# Patient Record
Sex: FEMALE | Race: WHITE | NOT HISPANIC OR LATINO | Employment: FULL TIME | URBAN - METROPOLITAN AREA
[De-identification: names, ages, dates, MRNs, and addresses within clinical notes are randomized per-mention and may not be internally consistent; named-entity substitution may affect disease eponyms.]

---

## 2017-01-05 ENCOUNTER — MYC MEDICAL ADVICE (OUTPATIENT)
Dept: PEDIATRICS | Facility: CLINIC | Age: 57
End: 2017-01-05

## 2017-01-11 ENCOUNTER — MYC REFILL (OUTPATIENT)
Dept: PEDIATRICS | Facility: CLINIC | Age: 57
End: 2017-01-11

## 2017-01-11 DIAGNOSIS — N95.1 MENOPAUSAL SYNDROME (HOT FLASHES): ICD-10-CM

## 2017-01-11 NOTE — TELEPHONE ENCOUNTER
venlafaxine (EFFEXOR-XR) 75 MG 24 hr capsule    Last Written Prescription Date: 11/7/16  Last Fill Quantity: 90, # refills: 0  Last Office Visit with [facility], [facility] or [facility] prescribing provider: 1/15/16   Next 5 appointments (look out 90 days)     Feb 20, 2017  7:45 AM   Lab visit with NL LAB [facility]   [facility]    [address] [facility] [address]   [phone]            Feb 20, 2017  8:00 AM   Screening Mammogram with ERMA1   [facility])    [address] [facility] [address]   [phone]            Feb 20, 2017  8:30 AM   MyChart Physical Adult with Bel Lowry MD   [facility] ([facility])    [address] [facility] [address]  [facility] [address]   [phone]                   BP Readings from Last 3 Encounters:   01/15/16 130/84   01/20/15 112/76   02/17/14 123/76     Pulse: (for Fetzima)  CREATININE   Date Value Ref Range Status   11/14/2013 0.66 0.52 - 1.04 mg/dL Final   ]    Last PHQ-9 score on record=   PHQ-9 SCORE 1/20/2015   Total Score 1   Total Score Lexis -

## 2017-01-11 NOTE — TELEPHONE ENCOUNTER
Message from MyChart:  Original authorizing provider: Braulio Cruz MD, MD Char Newman would like a refill of the following medications:  venlafaxine (EFFEXOR-XR) 75 MG 24 hr capsule [Braulio Cruz MD, MD]    Preferred pharmacy: [facility] [address]    Comment:  Thank you. Char Newman

## 2017-01-17 NOTE — TELEPHONE ENCOUNTER
Jazmine Epstein CMA at 1/11/2017  3:28 PM      Status: Signed         Expand All Collapse All    venlafaxine (EFFEXOR-XR) 75 MG 24 hr capsule    Last Written Prescription Date: 11/7/16  Last Fill Quantity: 90, # refills: 0  Last Office Visit with [facility], [facility] or [facility] prescribing provider: 1/15/16    Next 5 appointments (look out 90 days)       Feb 20, 2017  7:45 AM    Lab visit with NL LAB E[facility]    [facility] ([facility])      [address] [facility] [address]    [phone]                 Feb 20, 2017  8:00 AM    Screening Mammogram with ERMA1    [facility] ([facility])      [address] [facility] [address]    [phone]                 Feb 20, 2017  8:30 AM    MyChart Physical Adult with Bel Lowry MD    [facility] ([facility])      [address] [facility] [address]  [facility] [address]    [phone]                          BP Readings from Last 3 Encounters:    01/15/16  130/84    01/20/15  112/76    02/17/14  123/76      Pulse: (for Fetzima)  CREATININE    Date  Value  Ref Range  Status    11/14/2013  0.66  0.52 - 1.04 mg/dL  Final    ]    Last PHQ-9 score on record=    PHQ-9 SCORE  1/20/2015    Total Score  1    Total Score MyChart  -                       Patient needing appointment for refill per protocol.  Patient cancelled last two appointments.  Will route to provider to review and approve.

## 2017-01-18 RX ORDER — VENLAFAXINE HYDROCHLORIDE 75 MG/1
75 CAPSULE, EXTENDED RELEASE ORAL DAILY
Qty: 30 CAPSULE | Refills: 0 | Status: SHIPPED | OUTPATIENT
Start: 2017-01-18 | End: 2017-02-14

## 2017-02-14 DIAGNOSIS — N95.1 MENOPAUSAL SYNDROME (HOT FLASHES): ICD-10-CM

## 2017-02-14 NOTE — LETTER
February 24, 2017      Char Newman  [address] [facility] [address]      Dear Char,    We recently received a call from your pharmacy requesting a refill of your medication.    A review of your chart indicates that an appointment is required with your provider.  Please call the clinic to schedule your annual physical appointment.    We have authorized one refill of your medication to allow time for you to schedule.   If you have a history of diabetes or high cholesterol, please come in fasting for the appointment. Fasting entails nothing to eat or drink 8 hours prior to your appointment; with the exception on water. You may take your medication the day of the appointment.    Thank you,      Braulio Cruz MD

## 2017-02-15 NOTE — TELEPHONE ENCOUNTER
Venlafaxine     Last Written Prescription Date: 01/18/2017  Last Fill Quantity: 30, # refills: 0  Last Office Visit with [facility] primary care provider:  01/15/2016   Next 5 appointments (look out 90 days)     Feb 20, 2017  7:45 AM CST   Lab visit with [facility]   [facility]    [address] [facility] [address]   [phone]            Feb 20, 2017  8:00 AM CST   Screening Mammogram with ERMA1   [facility]    [address] [facility] [address]   [phone]            Feb 20, 2017  8:30 AM CST   MyChart Physical Adult with Bel Lowry MD   [facility]    [address] [address]   [phone]                   Last PHQ-9 score on record=   PHQ-9 SCORE 1/20/2015   Total Score 1   Total Score MyChart -     PHQ-9 SCORE 12/9/2013 12/9/2013 1/20/2015   Total Score 2 - 1   Total Score MyChart - 2 -     OLI-7 SCORE 4/12/2013 5/29/2013 1/20/2015   Total Score 13 9 2       César VILCHIS (R)

## 2017-02-16 RX ORDER — VENLAFAXINE HYDROCHLORIDE 75 MG/1
75 CAPSULE, EXTENDED RELEASE ORAL DAILY
Qty: 14 CAPSULE | Refills: 0 | Status: SHIPPED | OUTPATIENT
Start: 2017-02-16 | End: 2017-02-16

## 2017-02-16 NOTE — TELEPHONE ENCOUNTER
Routing refill request to provider for review/approval because:  Annabel given x1 and patient did not follow up, please advise  Patient needs to be seen because it has been more than 1 year since last office visit.    Patient cancelled last 2 appointments.    Nelli Oneill RN,   [facility]

## 2017-02-16 NOTE — TELEPHONE ENCOUNTER
Gave 2 weeks. Pt has appointment with Dr. Lowry on 2/20/17 for physical. Advise patient to have Dr. Lowry future refill.

## 2017-02-24 NOTE — TELEPHONE ENCOUNTER
Attempt #3:  Left message for patient stating prescription has been sent to the pharmacy. Advised patient to call clinic to schedule annual physical appointment prior to next refill Clinic number given.    Unable to contact patient via phone. No future appointment has been scheduled at this time.  Refill letter mailed to patient.  Melissa Lea CMA

## 2017-04-05 ENCOUNTER — OFFICE VISIT (OUTPATIENT)
Dept: PEDIATRICS | Facility: CLINIC | Age: 57
End: 2017-04-05
Payer: COMMERCIAL

## 2017-04-05 VITALS
TEMPERATURE: 98.1 F | DIASTOLIC BLOOD PRESSURE: 84 MMHG | BODY MASS INDEX: 28.53 KG/M2 | OXYGEN SATURATION: 100 % | SYSTOLIC BLOOD PRESSURE: 126 MMHG | WEIGHT: 161 LBS | HEIGHT: 63 IN | HEART RATE: 79 BPM

## 2017-04-05 DIAGNOSIS — F41.1 GAD (GENERALIZED ANXIETY DISORDER): ICD-10-CM

## 2017-04-05 DIAGNOSIS — Z11.59 NEED FOR HEPATITIS C SCREENING TEST: ICD-10-CM

## 2017-04-05 DIAGNOSIS — F32.5 MAJOR DEPRESSION IN COMPLETE REMISSION (H): Primary | ICD-10-CM

## 2017-04-05 DIAGNOSIS — N95.1 MENOPAUSAL SYNDROME (HOT FLASHES): ICD-10-CM

## 2017-04-05 DIAGNOSIS — L85.3 DRY SKIN: ICD-10-CM

## 2017-04-05 DIAGNOSIS — E78.5 HYPERLIPIDEMIA LDL GOAL <160: ICD-10-CM

## 2017-04-05 PROCEDURE — 99213 OFFICE O/P EST LOW 20 MIN: CPT | Performed by: INTERNAL MEDICINE

## 2017-04-05 RX ORDER — VENLAFAXINE HYDROCHLORIDE 150 MG/1
150 CAPSULE, EXTENDED RELEASE ORAL DAILY
Qty: 30 CAPSULE | Refills: 5 | Status: SHIPPED | OUTPATIENT
Start: 2017-04-05 | End: 2017-09-22

## 2017-04-05 ASSESSMENT — ANXIETY QUESTIONNAIRES
1. FEELING NERVOUS, ANXIOUS, OR ON EDGE: MORE THAN HALF THE DAYS
2. NOT BEING ABLE TO STOP OR CONTROL WORRYING: MORE THAN HALF THE DAYS
6. BECOMING EASILY ANNOYED OR IRRITABLE: SEVERAL DAYS
7. FEELING AFRAID AS IF SOMETHING AWFUL MIGHT HAPPEN: NOT AT ALL
GAD7 TOTAL SCORE: 13
5. BEING SO RESTLESS THAT IT IS HARD TO SIT STILL: NEARLY EVERY DAY
3. WORRYING TOO MUCH ABOUT DIFFERENT THINGS: MORE THAN HALF THE DAYS

## 2017-04-05 ASSESSMENT — PATIENT HEALTH QUESTIONNAIRE - PHQ9: 5. POOR APPETITE OR OVEREATING: NEARLY EVERY DAY

## 2017-04-05 NOTE — LETTER
My Depression Action Plan  Name: Char Newman   Date of Birth 1960  Date: 4/5/2017    My doctor: Braulio Cruz   My clinic: [address] [address] [phone]          GREEN    ZONE   Good Control    What it looks like:     Things are going generally well. You have normal up s and down s. You may even feel depressed from time to time, but bad moods usually last less than a day.   What you need to do:  1. Continue to care for yourself (see self care plan)  2. Check your depression survival kit and update it as needed  3. Follow your physician s recommendations including any medication.  4. Do not stop taking medication unless you consult with your physician first.           YELLOW         ZONE Getting Worse    What it looks like:     Depression is starting to interfere with your life.     It may be hard to get out of bed; you may be starting to isolate yourself from others.    Symptoms of depression are starting to last most all day and this has happened for several days.     You may have suicidal thoughts but they are not constant.   What you need to do:     1. Call your care team, your response to treatment will improve if you keep your care team informed of your progress. Yellow periods are signs an adjustment may need to be made.     2. Continue your self-care, even if you have to fake it!    3. Talk to someone in your support network    4. Open up your depression survival kit           RED    ZONE Medical Alert - Get Help    What it looks like:     Depression is seriously interfering with your life.     You may experience these or other symptoms: You can t get out of bed most days, can t work or engage in other necessary activities, you have trouble taking care of basic hygiene, or basic responsibilities, thoughts of suicide or death that will not go away, self-injurious behavior.     What you need to do:  1. Call your care team and request  a same-day appointment. If they are not available (weekends or after hours) call your local crisis line, emergency room or 911.      Electronically signed by: Braulio Cruz MD, April 5, 2017    Depression Self Care Plan / Survival Kit    Self-Care for Depression  Here s the deal. Your body and mind are really not as separate as most people think.  What you do and think affects how you feel and how you feel influences what you do and think. This means if you do things that people who feel good do, it will help you feel better.  Sometimes this is all it takes.  There is also a place for medication and therapy depending on how severe your depression is, so be sure to consult with your medical provider and/ or Behavioral Health Consultant if your symptoms are worsening or not improving.     In order to better manage my stress, I will:    Exercise  Get some form of exercise, every day. This will help reduce pain and release endorphins, the  feel good  chemicals in your brain. This is almost as good as taking antidepressants!  This is not the same as joining a gym and then never going! (they count on that by the way ) It can be as simple as just going for a walk or doing some gardening, anything that will get you moving.      Hygiene   Maintain good hygiene (Get out of bed in the morning, Make your bed, Brush your teeth, Take a shower, and Get dressed like you were going to work, even if you are unemployed).  If your clothes don't fit try to get ones that do.    Diet  I will strive to eat foods that are good for me, drink plenty of water, and avoid excessive sugar, caffeine, alcohol, and other mood-altering substances.  Some foods that are helpful in depression are: complex carbohydrates, B vitamins, flaxseed, fish or fish oil, fresh fruits and vegetables.    Psychotherapy  I agree to participate in Individual Therapy (if recommended).    Medication  If prescribed medications, I agree to take them.  Missing doses can result  in serious side effects.  I understand that drinking alcohol, or other illicit drug use, may cause potential side effects.  I will not stop my medication abruptly without first discussing it with my provider.    Staying Connected With Others  I will stay in touch with my friends, family members, and my primary care provider/team.    Use your imagination  Be creative.  We all have a creative side; it doesn t matter if it s oil painting, sand castles, or mud pies! This will also kick up the endorphins.    Witness Beauty  (AKA stop and smell the roses) Take a look outside, even in mid-winter. Notice colors, textures. Watch the squirrels and birds.     Service to others  Be of service to others.  There is always someone else in need.  By helping others we can  get out of ourselves  and remember the really important things.  This also provides opportunities for practicing all the other parts of the program.    Humor  Laugh and be silly!  Adjust your TV habits for less news and crime-drama and more comedy.    Control your stress  Try breathing deep, massage therapy, biofeedback, and meditation. Find time to relax each day.     My support system    Clinic Contact:  Phone number:    Contact 1:  Phone number:    Contact 2:  Phone number:    [facility]/:  Phone number:    Therapist:  Phone number:    Local crisis center:    Phone number:    Other community support:  Phone number:

## 2017-04-05 NOTE — PROGRESS NOTES
"  SUBJECTIVE:                                                    Char Newman is a 56 year old female who presents to clinic today for the following health issues:      Medication Followup of all meds    Taking Medication as prescribed: yes    Side Effects:  None    Medication Helping Symptoms:  yes     She is doing well. Her mother passed away and she is taking care of her father.   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 Now, more stress, and anxiety.   Took Effexor for hot flashes, not working well for that. She was to increase Effexor from 75 to 150 mg but she stayed at 75 mg.   Reports increase dry skin and some weight gain. No change in eating.     PHQ-9 SCORE 12/9/2013 1/20/2015 4/5/2017   Total Score - 1 -   Total Score MyChart 2 - -   Total Score - - 3     OLI-7 SCORE 5/29/2013 1/20/2015 4/5/2017   Total Score 9 2 -   Total Score - - 13             Problem list, Medication list, Allergies, and Medical/Social/Surgical histories reviewed in [facility] and updated as appropriate.    OBJECTIVE:                                                    /84 (Cuff Size: Adult Regular)  Pulse 79  Temp 98.1  F (36.7  C) (Temporal)  Ht 5' 3\" (1.6 m)  Wt 161 lb (73 kg)  LMP 10/10/2013  SpO2 100%  BMI 28.52 kg/m2    GEN: healthy, alert and no distress       Diagnostic test results:  No results found for this or any previous visit (from the past 24 hour(s)).       ASSESSMENT/PLAN:                                                      56 year old female with the following diagnoses and treatment plan:      ICD-10-CM    1. Major depression in complete remission (H) F32.5    2. Hyperlipidemia LDL goal <160 E78.5 Lipid Profile (Chol, Trig, HDL, LDL calc)   3. OLI (generalized anxiety disorder) F41.1    4. Need for hepatitis C screening test Z11.59 Hepatitis C antibody   5. Menopausal syndrome (hot flashes) N95.1 venlafaxine (EFFEXOR-XR) 150 MG 24 hr capsule   6. Dry skin L85.3 TSH with free T4 reflex     -- increase effexor to " 150 mg and Mychart update in 1 month.  -- due for a few labs, she will schedule.   -- if all normal, may return in one year for annual med review.     Braulio Cruz MD-PhD  [facility]    (Note: Chart documentation was done in part with Dragon Voice Recognition software. Although reviewed after completion, some word and grammatical errors may remain.)

## 2017-04-05 NOTE — PATIENT INSTRUCTIONS
Make appointment(s) for:   -- fasting lab appointment.  -- Mychart update in one month.        Medication(s) prescribed today:    Orders Placed This Encounter   Medications     venlafaxine (EFFEXOR-XR) 150 MG 24 hr capsule     Sig: Take 1 capsule (150 mg) by mouth daily     Dispense:  30 capsule     Refill:  5

## 2017-04-05 NOTE — MR AVS SNAPSHOT
After Visit Summary   4/5/2017    Char Newman    MRN: [id number]           Patient Information     Date Of Birth          1960        Visit Information        Provider Department      4/5/2017 2:20 PM Braulio Cruz MD [facility]        Today's Diagnoses     Major depression in complete remission (H)    -  1    Hyperlipidemia LDL goal <160        OLI (generalized anxiety disorder)        Need for hepatitis C screening test        Menopausal syndrome (hot flashes)        Dry skin          Care Instructions    Make appointment(s) for:   -- fasting lab appointment.  -- [employer] update in one month.        Medication(s) prescribed today:    Orders Placed This Encounter   Medications     venlafaxine (EFFEXOR-XR) 150 MG 24 hr capsule     Sig: Take 1 capsule (150 mg) by mouth daily     Dispense:  30 capsule     Refill:  5               Follow-ups after your visit        Future tests that were ordered for you today     Open Future Orders        Priority Expected Expires Ordered    TSH with free T4 reflex Routine  4/5/2018 4/5/2017    Hepatitis C antibody Routine  7/5/2017 4/5/2017    Lipid Profile (Chol, Trig, HDL, LDL calc) Routine  7/5/2017 4/5/2017            Who to contact     If you have questions or need follow up information about today's clinic visit or your schedule please contact [facility] directly at [phone].  Normal or non-critical lab and imaging results will be communicated to you by MyChart, letter or phone within 4 business days after the clinic has received the results. If you do not hear from us within 7 days, please contact the clinic through [employer] or phone. If you have a critical or abnormal lab result, we will notify you by phone as soon as possible.  Submit refill requests through [employer] or call your pharmacy and they will forward the refill request to us. Please allow 3 business days for your refill to be completed.          Additional  "Information About Your Visit        [employer] Information     [facility] [facility] gives you secure access to your electronic health record. If you see a primary care provider, you can also send messages to your care team and make appointments. If you have questions, please call your primary care clinic.  If you do not have a primary care provider, please call [phone] and they will assist you.      [facility] [facility] is an electronic gateway that provides easy, online access to your medical records. With [facility] [facility], you can request a clinic appointment, read your test results, renew a prescription or communicate with your care team.     To access your existing account, please contact your [facility] or call [phone] for assistance.        Care EveryWhere ID     This is your Care EveryWhere ID. This could be used by other organizations to access your [facility] [place] medical records  [id number]        Your Vitals Were     Pulse Temperature Height Last Period Pulse Oximetry BMI (Body Mass Index)    79 98.1  F (36.7  C) (Temporal) 5' 3\" (1.6 m) 10/10/2013 100% 28.52 kg/m2       Blood Pressure from Last 3 Encounters:   04/05/17 126/84   01/15/16 130/84   01/20/15 112/76    Weight from Last 3 Encounters:   04/05/17 161 lb (73 kg)   01/15/16 157 lb (71.2 kg)   01/20/15 154 lb (69.9 kg)              We Performed the Following     DEPRESSION ACTION PLAN (DAP)          Today's Medication Changes          These changes are accurate as of: 4/5/17  2:47 PM.  If you have any questions, ask your nurse or doctor.               These medicines have changed or have updated prescriptions.        Dose/Directions    venlafaxine 150 MG 24 hr capsule   Commonly known as:  EFFEXOR-XR   This may have changed:  medication strength   Used for:  Menopausal syndrome (hot flashes)   Changed by:  Braulio Cruz MD        Dose:  150 mg   Take 1 capsule (150 mg) by mouth daily   Quantity:  30 capsule   Refills:  5            Where to get your " medicines      These medications were sent to [facility] [id number] [facility] - [place], [place] - [address]  [address], [facility] [address]     Phone:  [phone]     venlafaxine 150 MG 24 hr capsule                Primary Care Provider Office Phone # Fax #    Braulio Cruz [phone]       [facility] [address]  [facility] [address]        Thank you!     Thank you for choosing [facility]  for your care. Our goal is always to provide you with excellent care. Hearing back from our patients is one way we can continue to improve our services. Please take a few minutes to complete the written survey that you may receive in the mail after your visit with us. Thank you!             Your Updated Medication List - Protect others around you: Learn how to safely use, store and throw away your medicines at www.disposemymeds.org.          This list is accurate as of: 4/5/17  2:47 PM.  Always use your most recent med list.                   Brand Name Dispense Instructions for use    acetaminophen 500 MG tablet    TYLENOL     Take 500-1,000 mg by mouth every 6 hours as needed For cramps       triamcinolone 0.1 % cream    KENALOG    80 g    Apply sparingly to affected area 2 times daily as needed       venlafaxine 150 MG 24 hr capsule    EFFEXOR-XR    30 capsule    Take 1 capsule (150 mg) by mouth daily

## 2017-04-05 NOTE — NURSING NOTE
"Chief Complaint   Patient presents with     Recheck Medication       Initial Temp 98.1  F (36.7  C) (Temporal)  Ht 5' 3\" (1.6 m)  Wt 161 lb (73 kg)  LMP 10/10/2013  BMI 28.52 kg/m2 Estimated body mass index is 28.52 kg/(m^2) as calculated from the following:    Height as of this encounter: 5' 3\" (1.6 m).    Weight as of this encounter: 161 lb (73 kg).  Medication Reconciliation: complete    "

## 2017-04-06 ASSESSMENT — ANXIETY QUESTIONNAIRES: GAD7 TOTAL SCORE: 13

## 2017-04-06 ASSESSMENT — PATIENT HEALTH QUESTIONNAIRE - PHQ9: SUM OF ALL RESPONSES TO PHQ QUESTIONS 1-9: 3

## 2017-08-11 ENCOUNTER — OFFICE VISIT (OUTPATIENT)
Dept: PEDIATRICS | Facility: CLINIC | Age: 57
End: 2017-08-11
Payer: COMMERCIAL

## 2017-08-11 ENCOUNTER — RADIANT APPOINTMENT (OUTPATIENT)
Dept: CT IMAGING | Facility: CLINIC | Age: 57
End: 2017-08-11
Attending: NURSE PRACTITIONER
Payer: COMMERCIAL

## 2017-08-11 ENCOUNTER — RADIANT APPOINTMENT (OUTPATIENT)
Dept: GENERAL RADIOLOGY | Facility: CLINIC | Age: 57
End: 2017-08-11
Attending: NURSE PRACTITIONER
Payer: COMMERCIAL

## 2017-08-11 VITALS
TEMPERATURE: 98.9 F | DIASTOLIC BLOOD PRESSURE: 72 MMHG | OXYGEN SATURATION: 98 % | BODY MASS INDEX: 28.34 KG/M2 | WEIGHT: 160 LBS | SYSTOLIC BLOOD PRESSURE: 116 MMHG | HEART RATE: 87 BPM

## 2017-08-11 DIAGNOSIS — L30.9 DERMATITIS: ICD-10-CM

## 2017-08-11 DIAGNOSIS — R31.9 HEMATURIA: ICD-10-CM

## 2017-08-11 DIAGNOSIS — Z13.29 SCREENING FOR THYROID DISORDER: ICD-10-CM

## 2017-08-11 DIAGNOSIS — R30.0 DYSURIA: Primary | ICD-10-CM

## 2017-08-11 DIAGNOSIS — M85.841 OSTEOPENIA OF BOTH HANDS: Primary | ICD-10-CM

## 2017-08-11 DIAGNOSIS — M25.50 PAIN IN JOINT, MULTIPLE SITES: ICD-10-CM

## 2017-08-11 DIAGNOSIS — M85.842 OSTEOPENIA OF BOTH HANDS: Primary | ICD-10-CM

## 2017-08-11 LAB
ALBUMIN UR-MCNC: NEGATIVE MG/DL
APPEARANCE UR: CLEAR
BILIRUB UR QL STRIP: NEGATIVE
CK SERPL-CCNC: 79 U/L (ref 30–225)
COLOR UR AUTO: YELLOW
CRP SERPL-MCNC: <2.9 MG/L (ref 0–8)
ERYTHROCYTE [DISTWIDTH] IN BLOOD BY AUTOMATED COUNT: 14 % (ref 10–15)
ERYTHROCYTE [SEDIMENTATION RATE] IN BLOOD BY WESTERGREN METHOD: 8 MM/H (ref 0–30)
GLUCOSE UR STRIP-MCNC: NEGATIVE MG/DL
HCT VFR BLD AUTO: 40.6 % (ref 35–47)
HGB BLD-MCNC: 13.6 G/DL (ref 11.7–15.7)
HGB UR QL STRIP: NEGATIVE
KETONES UR STRIP-MCNC: NEGATIVE MG/DL
LEUKOCYTE ESTERASE UR QL STRIP: NEGATIVE
MCH RBC QN AUTO: 29.2 PG (ref 26.5–33)
MCHC RBC AUTO-ENTMCNC: 33.5 G/DL (ref 31.5–36.5)
MCV RBC AUTO: 87 FL (ref 78–100)
MUCOUS THREADS #/AREA URNS LPF: ABNORMAL /LPF
NITRATE UR QL: NEGATIVE
NON-SQ EPI CELLS #/AREA URNS LPF: ABNORMAL /LPF
PH UR STRIP: 6 PH (ref 5–7)
PLATELET # BLD AUTO: 256 10E9/L (ref 150–450)
RBC # BLD AUTO: 4.65 10E12/L (ref 3.8–5.2)
RBC #/AREA URNS AUTO: ABNORMAL /HPF (ref 0–2)
SP GR UR STRIP: 1.01 (ref 1–1.03)
TSH SERPL DL<=0.005 MIU/L-ACNC: 2.76 MU/L (ref 0.4–4)
URN SPEC COLLECT METH UR: ABNORMAL
UROBILINOGEN UR STRIP-MCNC: NORMAL MG/DL (ref 0–2)
WBC # BLD AUTO: 5.9 10E9/L (ref 4–11)
WBC #/AREA URNS AUTO: ABNORMAL /HPF (ref 0–2)

## 2017-08-11 PROCEDURE — 81001 URINALYSIS AUTO W/SCOPE: CPT | Performed by: NURSE PRACTITIONER

## 2017-08-11 PROCEDURE — 85652 RBC SED RATE AUTOMATED: CPT | Performed by: NURSE PRACTITIONER

## 2017-08-11 PROCEDURE — 87086 URINE CULTURE/COLONY COUNT: CPT | Performed by: NURSE PRACTITIONER

## 2017-08-11 PROCEDURE — 73120 X-RAY EXAM OF HAND: CPT | Mod: RT | Performed by: RADIOLOGY

## 2017-08-11 PROCEDURE — 86038 ANTINUCLEAR ANTIBODIES: CPT | Performed by: NURSE PRACTITIONER

## 2017-08-11 PROCEDURE — 99214 OFFICE O/P EST MOD 30 MIN: CPT | Performed by: NURSE PRACTITIONER

## 2017-08-11 PROCEDURE — 36415 COLL VENOUS BLD VENIPUNCTURE: CPT | Performed by: NURSE PRACTITIONER

## 2017-08-11 PROCEDURE — 86431 RHEUMATOID FACTOR QUANT: CPT | Performed by: NURSE PRACTITIONER

## 2017-08-11 PROCEDURE — 84443 ASSAY THYROID STIM HORMONE: CPT | Performed by: NURSE PRACTITIONER

## 2017-08-11 PROCEDURE — 74178 CT ABD&PLV WO CNTR FLWD CNTR: CPT | Performed by: RADIOLOGY

## 2017-08-11 PROCEDURE — 86200 CCP ANTIBODY: CPT | Performed by: NURSE PRACTITIONER

## 2017-08-11 PROCEDURE — 85027 COMPLETE CBC AUTOMATED: CPT | Performed by: NURSE PRACTITIONER

## 2017-08-11 PROCEDURE — 82550 ASSAY OF CK (CPK): CPT | Performed by: NURSE PRACTITIONER

## 2017-08-11 PROCEDURE — 86140 C-REACTIVE PROTEIN: CPT | Performed by: NURSE PRACTITIONER

## 2017-08-11 RX ORDER — IOPAMIDOL 755 MG/ML
99 INJECTION, SOLUTION INTRAVASCULAR ONCE
Status: COMPLETED | OUTPATIENT
Start: 2017-08-11 | End: 2017-08-11

## 2017-08-11 RX ADMIN — IOPAMIDOL 99 ML: 755 INJECTION, SOLUTION INTRAVASCULAR at 10:59

## 2017-08-11 NOTE — NURSING NOTE
"Chief Complaint   Patient presents with     UTI     Derm Problem     Rash on legs and abdomen arms for 2 years.        Initial /72  Pulse 87  Temp 98.9  F (37.2  C) (Temporal)  Wt 160 lb (72.6 kg)  LMP 10/10/2013  SpO2 98%  BMI 28.34 kg/m2 Estimated body mass index is 28.34 kg/(m^2) as calculated from the following:    Height as of 4/5/17: 5' 3\" (1.6 m).    Weight as of this encounter: 160 lb (72.6 kg).  Medication Reconciliation: complete       "

## 2017-08-11 NOTE — PATIENT INSTRUCTIONS
PLAN:   1.   Symptomatic therapy suggested: increase fluids and call prn if symptoms persist or worsen.  2.  Orders Placed This Encounter   Procedures     CT Abdomen Pelvis w/o & w Contrast     XR Hand Bilateral 2 Views     UA with Microscopic reflex to Culture ([place]; [facility])     CBC with platelets     CRP inflammation     Erythrocyte sedimentation rate auto     Rheumatoid factor     Cyclic Citrullinated Peptide Antibody IgG     Antinuclear antibody screen by EIA     CK total     TSH with free T4 reflex     DERMATOLOGY REFERRAL     UROLOGY ADULT REFERRAL       3. Patient needs to follow up in if no improvement,or sooner if worsening of symptoms or other symptoms develop.  FURTHER TESTING:       - CT ABDOMEN AND PELVIS   CONSULTATION/REFERRAL to Dermatology AND Urology   Will follow up and/or notify patient of  results via My Chart to determine further need for followup    It was a pleasure seeing you today at the [facility]. Thank you for allowing us to care for you today. We truly hope we provided you with the excellent service you deserve. Please let us know if there is anything else we can do for you so we can be sure you are leaving completley satisfied with your care experience.       General information about your clinic   Clinic Hours Lab Hours (Appointments are required)   Mon-Thurs: 7:30 AM - 7 PM Mon-Thurs: 7:30 AM - 7 PM   Fri: 7:30 AM - 5 PM Fri: 7:30 AM - 5 PM        After Hours Nurse Advise & Appts:  [facility]: [phone]  [facility] On Call: to make appointments anytime: [phone] On Call Physician: call [phone] and answering service will page the on call physician.        For urgent appointments, please call [phone] and ask for the triage nurse or your care team clinic nurse.  How to contact my care team:  MyChart: [email or web address]   Phone: [phone]   Fax: [phone]       [facility]:   Phone:  [phone]  Hours: 8:00 AM - 6:00 PM  Medication Refills:  Call your pharmacy and they will forward the refill to us. Please allow 3 business days for your refills to be completed.       Normal or non-critical lab and imaging results will be communicated to you by MyChart, letter or phone within 7 days.  If you do not hear from us within 10 days, please call the clinic. If you have a critical or abnormal lab result, we will notify you by phone as soon as possible.       We now have PWIC (Pediatric Walk in Care)  Monday-Friday from 7:30-4. Simply walk in and be seen for your urgent needs like cough, fever, rash, diarrhea or vomiting, pink eye, UTI. No appointments needed. Ask one of the team for more information      -Your Care Team:    Dr. Neo Wynn - Internal Medicine  Dr. Braulio Cruz - Internal Medicine/Pediatrics   Dr. Halie Arshad - Family Medicine  Dr. Melisa Araiza - Pediatrics  Dr. Mariela Najera - Pediatrics  Dana Lobo CNP - Family Practice Nurse Practitioner

## 2017-08-11 NOTE — MR AVS SNAPSHOT
After Visit Summary   8/11/2017    Char Newman    MRN: [id number]           Patient Information     Date Of Birth          1960        Visit Information        Provider Department      8/11/2017 9:40 AM Dana Lobo APRN CNP [facility]        Today's Diagnoses     Dysuria    -  1    Dermatitis        Hematuria        Pain in joint, multiple sites        Screening for thyroid disorder          Care Instructions    PLAN:   1.   Symptomatic therapy suggested: increase fluids and call prn if symptoms persist or worsen.  2.  Orders Placed This Encounter   Procedures     CT Abdomen Pelvis w/o & w Contrast     XR Hand Bilateral 2 Views     UA with Microscopic reflex to Culture ([place]; [facility])     CBC with platelets     CRP inflammation     Erythrocyte sedimentation rate auto     Rheumatoid factor     Cyclic Citrullinated Peptide Antibody IgG     Antinuclear antibody screen by EIA     CK total     TSH with free T4 reflex     DERMATOLOGY REFERRAL     UROLOGY ADULT REFERRAL       3. Patient needs to follow up in if no improvement,or sooner if worsening of symptoms or other symptoms develop.  FURTHER TESTING:       - CT ABDOMEN AND PELVIS   CONSULTATION/REFERRAL to Dermatology AND Urology   Will follow up and/or notify patient of  results via My Chart to determine further need for followup    It was a pleasure seeing you today at the [facility]. Thank you for allowing us to care for you today. We truly hope we provided you with the excellent service you deserve. Please let us know if there is anything else we can do for you so we can be sure you are leaving completley satisfied with your care experience.       General information about your clinic   Clinic Hours Lab Hours (Appointments are required)   Mon-Thurs: 7:30 AM - 7 PM Mon-Thurs: 7:30 AM - 7 PM   Fri: 7:30 AM - 5 PM Fri: 7:30 AM - 5 PM        After Hours Nurse Advise &  Appts:  [facility]: [phone]  [facility] On Call: to make appointments anytime: [phone] On Call Physician: call [phone] and answering service will page the on call physician.        For urgent appointments, please call [phone] and ask for the triage nurse or your care team clinic nurse.  How to contact my care team:  MyChart: [email or web address]   Phone: [phone]   Fax: [phone]       [facility]:   Phone: [phone]  Hours: 8:00 AM - 6:00 PM  Medication Refills:  Call your pharmacy and they will forward the refill to us. Please allow 3 business days for your refills to be completed.       Normal or non-critical lab and imaging results will be communicated to you by MyChart, letter or phone within 7 days.  If you do not hear from us within 10 days, please call the clinic. If you have a critical or abnormal lab result, we will notify you by phone as soon as possible.       We now have PWIC (Pediatric Walk in Care)  Monday-Friday from 7:30-4. Simply walk in and be seen for your urgent needs like cough, fever, rash, diarrhea or vomiting, pink eye, UTI. No appointments needed. Ask one of the team for more information      -Your Care Team:    Dr. Neo Wynn - Internal Medicine  Dr. Braulio Cruz - Internal Medicine/Pediatrics   Dr. Halie Arshad - Family Medicine  Dr. Melisa Araiza - Pediatrics  Dr. Mariela Najera - Pediatrics  Dana Lobo CNP - Family Practice Nurse Practitioner                     Follow-ups after your visit        Additional Services     DERMATOLOGY REFERRAL       Your provider has referred you to: [facility] - Jennifer Geller [phone]   [email or web address]    Please be aware that coverage of these services is subject to the terms and limitations of your health insurance plan.  Call member services at your health plan with any benefit or coverage questions.      Please bring the following with you to your  appointment:    (1) Any X-Rays, CTs or MRIs which have been performed.  Contact the facility where they were done to arrange for  prior to your scheduled appointment.    (2) List of current medications  (3) This referral request   (4) Any documents/labs given to you for this referral            UROLOGY ADULT REFERRAL       Your provider has referred you to: [facility]: [facility] [phone]   [email or web address]    Please be aware that coverage of these services is subject to the terms and limitations of your health insurance plan.  Call member services at your health plan with any benefit or coverage questions.      Please bring the following with you to your appointment:    (1) Any X-Rays, CTs or MRIs which have been performed.  Contact the facility where they were done to arrange for  prior to your scheduled appointment.    (2) List of current medications  (3) This referral request   (4) Any documents/labs given to you for this referral                  Future tests that were ordered for you today     Open Future Orders        Priority Expected Expires Ordered    XR Hand Bilateral 2 Views Routine 8/11/2017 8/11/2018 8/11/2017    CT Abdomen Pelvis w/o & w Contrast Routine  8/11/2018 8/11/2017            Who to contact     If you have questions or need follow up information about today's clinic visit or your schedule please contact [facility] directly at [phone].  Normal or non-critical lab and imaging results will be communicated to you by MyChart, letter or phone within 4 business days after the clinic has received the results. If you do not hear from us within 7 days, please contact the clinic through MyChart or phone. If you have a critical or abnormal lab result, we will notify you by phone as soon as possible.  Submit refill requests through [employer] or call your pharmacy and they will forward the  refill request to us. Please allow 3 business days for your refill to be completed.          Additional Information About Your Visit        [facility] Information     [employer] gives you secure access to your electronic health record. If you see a primary care provider, you can also send messages to your care team and make appointments. If you have questions, please call your primary care clinic.  If you do not have a primary care provider, please call [phone] and they will assist you.      [employer] is an electronic gateway that provides easy, online access to your medical records. With [employer], you can request a clinic appointment, read your test results, renew a prescription or communicate with your care team.     To access your existing account, please contact your [facility] or call [phone] for assistance.        Care EveryWhere ID     This is your Care EveryWhere ID. This could be used by other organizations to access your [place] medical records  [id number]        Your Vitals Were     Pulse Temperature Last Period Pulse Oximetry BMI (Body Mass Index)       87 98.9  F (37.2  C) (Temporal) 10/10/2013 98% 28.34 kg/m2        Blood Pressure from Last 3 Encounters:   08/11/17 116/72   04/05/17 126/84   01/15/16 130/84    Weight from Last 3 Encounters:   08/11/17 160 lb (72.6 kg)   04/05/17 161 lb (73 kg)   01/15/16 157 lb (71.2 kg)              We Performed the Following     Antinuclear antibody screen by EIA     CBC with platelets     CK total     CRP inflammation     Cyclic Citrullinated Peptide Antibody IgG     DERMATOLOGY REFERRAL     Erythrocyte sedimentation rate auto     Rheumatoid factor     TSH with free T4 reflex     UA with Microscopic reflex to Culture (Saumya James; [facility])     Urine Culture Aerobic Bacterial     UROLOGY ADULT REFERRAL        Primary Care Provider Office Phone # Fax #    Braulio Cruz [phone] [address]  [place] [address]        Equal Access to Services     [facility]: Hadii pastor torres brianayesi Premaali, wafrankda mtlukeha, qabradfordta kakaylaastrid [place], [place]. So [facility] [phone].    ATENCIÓN: Si habla español, tiene a garcía disposición servicios gratuitos de asistencia lingüística. Nimishaame al [phone].    We comply with applicable federal civil rights laws and [place] laws. We do not discriminate on the basis of race, color, national origin, age, disability sex, sexual orientation or gender identity.            Thank you!     Thank you for choosing [facility]  for your care. Our goal is always to provide you with excellent care. Hearing back from our patients is one way we can continue to improve our services. Please take a few minutes to complete the written survey that you may receive in the mail after your visit with us. Thank you!             Your Updated Medication List - Protect others around you: Learn how to safely use, store and throw away your medicines at www.disposemymeds.org.          This list is accurate as of: 8/11/17 10:25 AM.  Always use your most recent med list.                   Brand Name Dispense Instructions for use Diagnosis    acetaminophen 500 MG tablet    TYLENOL     Take 500-1,000 mg by mouth every 6 hours as needed For cramps        triamcinolone 0.1 % cream    KENALOG    80 g    Apply sparingly to affected area 2 times daily as needed    Dermatitis       venlafaxine 150 MG 24 hr capsule    EFFEXOR-XR    30 capsule    Take 1 capsule (150 mg) by mouth daily    Menopausal syndrome (hot flashes)

## 2017-08-12 LAB
BACTERIA SPEC CULT: NORMAL
CCP AB SER IA-ACNC: 3 U/ML
MICRO REPORT STATUS: NORMAL
SPECIMEN SOURCE: NORMAL

## 2017-08-13 NOTE — PROGRESS NOTES
Jeane Newman,    Attached are your test results.  Xrays of hands are normal except they show signs of bone thinning   I will order a bone density as can be sign of osteoporosis   I will place order. Please call [phone] to schedule.     Please contact us if you have any questions.    Dana Lobo, CNP

## 2017-08-13 NOTE — PROGRESS NOTES
Jeane Newman,    Attached are your test results.  -All of your labs are normal.   Please contact us if you have any questions.    Dana Lobo, CNP

## 2017-08-13 NOTE — PROGRESS NOTES
Jeane Newman,    Attached are your test results.  The CT scan is normal except for kidney stone on the right   Please make appointment with urology as planned    Please contact us if you have any questions.    Dana Lobo, CNP

## 2017-08-14 LAB
ANA SER QL IA: NORMAL
RHEUMATOID FACT SER NEPH-ACNC: <20 IU/ML (ref 0–20)

## 2017-08-14 NOTE — PROGRESS NOTES
Jeane Newman,    Attached are your test results.  Lupus and rheumatoid screen are both negative    Please contact us if you have any questions.    Dana Lobo, CNP

## 2017-08-16 ENCOUNTER — PRE VISIT (OUTPATIENT)
Dept: UROLOGY | Facility: CLINIC | Age: 57
End: 2017-08-16

## 2017-08-16 NOTE — TELEPHONE ENCOUNTER
PREVISIT INFORMATION                                                    Char Newman scheduled for future visit at [facility] specialty clinics.    Patient is scheduled to see Rolan on 08/18  Reason for visit: Hematuria/ Dysuria   Referring provider: Dana Lobo APRN CNP  Has patient seen previous specialist? No  Medical Records:  Available in chart.  Patient was previously seen at a [place] or [facility] facility.     REVIEW                                                      New patient packet mailed to patient: No  Medication reconciliation complete: No      PLAN/FOLLOW-UP NEEDED                                                      Patient Reminders Given:    Informed patient to bring an updated list of allergies, medications, pharmacy details and insurance information. Directed patient to come to the 2nd floor, check-in #4 for their appointment. Informed patient to call back if appointment needs to be cancelled or rescheduled at [phone].    Reminded patient to bring any outside records regarding this appointment or have them faxed to clinic at [phone].    Reminded patient to complete and bring in urology questionnaire. Also for patient to please come with a full bladder and to ask , if early to get staff member for sample.

## 2017-08-18 ENCOUNTER — RADIANT APPOINTMENT (OUTPATIENT)
Dept: BONE DENSITY | Facility: CLINIC | Age: 57
End: 2017-08-18
Attending: NURSE PRACTITIONER
Payer: COMMERCIAL

## 2017-08-18 ENCOUNTER — OFFICE VISIT (OUTPATIENT)
Dept: UROLOGY | Facility: CLINIC | Age: 57
End: 2017-08-18
Attending: NURSE PRACTITIONER
Payer: COMMERCIAL

## 2017-08-18 VITALS — SYSTOLIC BLOOD PRESSURE: 131 MMHG | HEART RATE: 82 BPM | DIASTOLIC BLOOD PRESSURE: 84 MMHG

## 2017-08-18 DIAGNOSIS — M85.841 OSTEOPENIA OF BOTH HANDS: ICD-10-CM

## 2017-08-18 DIAGNOSIS — R31.0 GROSS HEMATURIA: Primary | ICD-10-CM

## 2017-08-18 DIAGNOSIS — Z13.820 SCREENING FOR OSTEOPOROSIS: ICD-10-CM

## 2017-08-18 DIAGNOSIS — M85.842 OSTEOPENIA OF BOTH HANDS: ICD-10-CM

## 2017-08-18 DIAGNOSIS — N39.46 MIXED INCONTINENCE: ICD-10-CM

## 2017-08-18 DIAGNOSIS — N20.0 NEPHROLITHIASIS: ICD-10-CM

## 2017-08-18 LAB
ALBUMIN UR-MCNC: NEGATIVE MG/DL
APPEARANCE UR: CLEAR
BACTERIA #/AREA URNS HPF: ABNORMAL /HPF
BILIRUB UR QL STRIP: NEGATIVE
COLOR UR AUTO: YELLOW
GLUCOSE UR STRIP-MCNC: NEGATIVE MG/DL
HGB UR QL STRIP: NEGATIVE
KETONES UR STRIP-MCNC: NEGATIVE MG/DL
LEUKOCYTE ESTERASE UR QL STRIP: ABNORMAL
MUCOUS THREADS #/AREA URNS LPF: PRESENT /LPF
NITRATE UR QL: NEGATIVE
NON-SQ EPI CELLS #/AREA URNS LPF: ABNORMAL /LPF
PH UR STRIP: 5.5 PH (ref 5–7)
RBC #/AREA URNS AUTO: ABNORMAL /HPF
SOURCE: ABNORMAL
SP GR UR STRIP: 1.02 (ref 1–1.03)
UROBILINOGEN UR STRIP-MCNC: NORMAL MG/DL (ref 0–2)
WBC #/AREA URNS AUTO: ABNORMAL /HPF

## 2017-08-18 PROCEDURE — 99214 OFFICE O/P EST MOD 30 MIN: CPT | Mod: 25 | Performed by: PHYSICIAN ASSISTANT

## 2017-08-18 PROCEDURE — 77081 DXA BONE DENSITY APPENDICULR: CPT | Performed by: RADIOLOGY

## 2017-08-18 PROCEDURE — 88112 CYTOPATH CELL ENHANCE TECH: CPT | Performed by: PHYSICIAN ASSISTANT

## 2017-08-18 PROCEDURE — 81001 URINALYSIS AUTO W/SCOPE: CPT | Performed by: PHYSICIAN ASSISTANT

## 2017-08-18 PROCEDURE — 51798 US URINE CAPACITY MEASURE: CPT | Performed by: PHYSICIAN ASSISTANT

## 2017-08-18 PROCEDURE — 77080 DXA BONE DENSITY AXIAL: CPT | Mod: 59 | Performed by: RADIOLOGY

## 2017-08-18 ASSESSMENT — PAIN SCALES - GENERAL: PAINLEVEL: MILD PAIN (3)

## 2017-08-18 NOTE — MR AVS SNAPSHOT
After Visit Summary   8/18/2017    Char Newman    MRN: [id number]           Patient Information     Date Of Birth          1960        Visit Information        Provider Department      8/18/2017 8:00 AM Mary Gongora PA-C [facility]        Today's Diagnoses     Gross hematuria    -  1      Care Instructions    UROLOGY CLINIC VISIT PATIENT INSTRUCTIONS    1) Return for the cystoscopy procedure with Dr. Farhana Brown (one of our urologists) to look into the bladder. Additional information on this procedure is listed below. Dr. Brown can also discuss options for treating your urinary incontinence at that time.     2) Work on the following kidney stone prevention measures:  - Drink plenty of fluids.  It is recommended that you drink at least 3 liters (100 ounces) per day, with a goal of making at least 2 liters (66 ounces) per day of urine.   - If alcoholic or caffeinated beverages are consumed then you need to drink water along with these beverages to maintain hydration.    - A few ounces of lemon juice concentrate can be diluted in your water each day to help prevent stones.    - You should limit intake of red meat, salt, and salty processed foods.    - Please maintain calcium intake in your diet through continued consumption of dairy products.   - You should limit foods that are high in oxalate such as spinach, sweet potatoes, dark chocolate, soy products, and some nuts such as peanuts.      CYSTOSCOPY    What is a Cystoscopy?  This is a procedure done to check for problems inside the bladder.  Problems may include polyps (growths), tumors, inflammation (swelling and redness) and other concerns.    The doctor inserts a thin tube (called a cystoscope) into the bladder.  The tube is about the size of a pencil.  We will give you numbing medicine to reduce the pain or discomfort you may feel.    The tube allows the doctor to:  The doctor will be able to see inside the bladder by  filling the bladder with water.  The water makes it easier to see any problems that may be present.    If needed, the doctor may use the tube to:  The doctor is able to take tissue samples (biopsies).  Samples are sent to the lab for testing.  The doctor can also burn off any small growths or tumors that are found.  This is call fulguration.    How should I get ready for the exam?  To prepare, stop taking any medications as instructed. Ask whether you should avoid eating or drinking anything after midnight before the procedure. Follow any other instructions your doctor gives you.    If you are having this procedure done at the clinic, you will be there for up to an hour.  You will receive care before and after the procedure.    Please tell your doctor if:  - You have a history of urinary tract infections.  - You know that you have a tumor in your bladder.  - You have bleeding problems.  - You have any allergies.  - You are or may be pregnant.      What happens after the exam?  You may go back to your normal diet and activity as you feel ready, unless your doctor tells you not to.    For the next two days, you may notice:  - Some blood in your urine.  - Some burning when you urinate (use the toilet).  - An urge to urinate more often.  - Bladder spasms.    These are normal after the procedure. They should go away on their own after a day or two.      - You can help to relieve the above listed symptoms by:  - Drinking 6 to 8 large glasses of water each day (includes drinks at meals).  This will help clear the urine.  - Take warm baths to relieve pain and bladder spasms.  Do not add anything to the bath water.  - Your doctor may prescribe pain medicine.  You may also take Tylenol (acetaminophen) for pain.    When should I call my doctor?  - A fever over 100.0 F (38 C) for more than a day.  (Before you call the doctor, check your temperature under your tongue.)  - Chills.  - Failure to urinate: No urine comes out when  you try to use the toilet.  (Try soaking in a bathtub full of warm water.  If still no urine, call your doctor.)  - A lot of blood in the urine or blood clots larger than a nickel.  - Pain in the back or abdomen (belly / stomach area).  - Pain or spasms that are not relieved by warm tub baths and pain medicine.  - Severe pain, burning or other problems while passing urine.  - Pain that gets worse after two days.      If you have any issues, questions or concerns in the meantime, do not hesitate to contact us at [phone] or via [employer].     It was a pleasure meeting with you today.  Thank you for allowing me and my team the privilege of caring for you today.  YOU are the reason we are here, and I truly hope we provided you with the excellent service you deserve.  Please let us know if there is anything else we can do for you so that we can be sure you are leaving completely satisfied with your care experience.    Cystoscopy    What is a Cystoscopy?  This is a procedure done to check for problems inside the bladder. Problems may include polyps (growths), tumors, inflammation (swelling and redness) and other concerns.    The doctor inserts a thin tube (called a cystoscope) into the bladder. The tube is about the size of a pencil. We will clean the area with special soap to remove bacteria and prevent post-procedure infection. We will give you numbing medicine (Lidocaine jelly) to reduce the pain or discomfort you may feel.    The tube allows the doctor to:  The doctor will be able to see inside the bladder by filling the bladder with water. The water makes it easier to see any problems that may be present.    If needed, the doctor may use the tube to:  The doctor is able to take tissue samples (biopsies). Samples are sent to the lab for testing.  The doctor can also burn off any small growths or tumors that are found. This is call fulguration.    How should I get ready for the exam?  There is no special preparation  you need to do for this exam. Staff may ask for a urine sample prior to rooming you. You may eat and drink a normal diet before and after the exam. Medications may be taken as usual unless otherwise directed by the physician.    Please tell your doctor if:  You have a history of urinary tract infections.  You know that you have a tumor in your bladder.  You have bleeding problems.  You have any allergies.  You are or may be pregnant.    What happens after the exam?  You may go back to your normal diet and activity as you feel ready, unless your doctor tells you not to.    For the next two days, you may notice:  Some blood in your urine.  Some burning when you urinate (use the toilet).  An urge to urinate more often.  Bladder spasms.    These are normal after the procedure. They should go away on their own after a day or two.      You can help to relieve the above listed symptoms by:  Drinking 6 to 8 large glasses of water each day (includes drinks at meals).  This will help clear the urine.  Take warm baths to relieve pain and bladder spasms.  Do not add anything to the bath water.  Your doctor may prescribe pain medicine.  You may also take Tylenol (acetaminophen) for pain.    When should I call my doctor?  A fever over 100.0 F (38 C) for more than a day.  (Before you call the doctor, check your temperature under your tongue.)  Chills.  Failure to urinate: No urine comes out when you try to use the toilet.  (Try soaking in a bathtub full of warm water.  If still no urine, call your doctor.)  A lot of blood in the urine or blood clots larger than a nickel.  Pain in the back or abdomen (belly / stomach area).  Pain or spasms that are not relieved by warm tub baths and pain medicine.  Severe pain, burning or other problems while passing urine.  Pain that gets worse after two days.                Follow-ups after your visit        Your next 10 appointments already scheduled     Aug 18, 2017  9:00 AM CDT   DX  HIP/PELVIS/SPINE with MGDX1   [facility] ([facility]    [address] [address] [place] [address] [phone]           Please do not take any of the following 24 hours prior to the day of your exam: vitamins, calcium tablets, antacids.  If possible, please wear clothes without metal (snaps, zippers). A sweatsuit works well.            Sep 12, 2017  9:00 AM CDT   CYSTO with Farhana Brown MD   [facility] ([facility]    [address] [facility] [address] [phone]              Who to contact     If you have questions or need follow up information about today's clinic visit or your schedule please contact [facility] directly at [phone].  Normal or non-critical lab and imaging results will be communicated to you by [employer], letter or phone within 4 business days after the clinic has received the results. If you do not hear from us within 7 days, please contact the clinic through [employer] or phone. If you have a critical or abnormal lab result, we will notify you by phone as soon as possible.  Submit refill requests through [employer] or call your pharmacy and they will forward the refill request to us. Please allow 3 business days for your refill to be completed.          Additional Information About Your Visit        [employer] Information     [employer] gives you secure access to your electronic health record. If you see a primary care provider, you can also send messages to your care team and make appointments. If you have questions, please call your primary care clinic.  If you do not have a primary care provider, please call [phone] and they will assist you.      [employer] is an electronic gateway that provides easy, online access to your medical records. With [employer], you can request a clinic appointment, read your test results, renew a prescription or communicate with your care team.     To  access your existing account, please contact your [facility] or call [phone] for assistance.        Care EveryWhere ID     This is your Care EveryWhere ID. This could be used by other organizations to access your [place] medical records  [id number]        Your Vitals Were     Pulse Last Period                82 10/10/2013           Blood Pressure from Last 3 Encounters:   08/18/17 131/84   08/11/17 116/72   04/05/17 126/84    Weight from Last 3 Encounters:   08/11/17 72.6 kg (160 lb)   04/05/17 73 kg (161 lb)   01/15/16 71.2 kg (157 lb)              We Performed the Following     Cytology non gyn [[id number]]     MEASURE POST-VOID RESIDUAL URINE/BLADDER CAPACITY, US NON-IMAGING     UA reflex to Microscopic and Culture        Primary Care Provider Office Phone # Fax #    Braulio Cruz [phone]       [address]  [facility] [address]        Equal Access to Services     BRYNN RESENDIZ : Hadii aad ku hadasho Soomaali, waaxda luqadaha, qaybta kaalmada adeegyada, waxay cha hayludmila matthew . So [facility] [phone].    ATENCIÓN: Si habla español, tiene a garcía disposición servicios gratuitos de asistencia lingüística. Llame al [phone].    We comply with applicable federal civil rights laws and [place] laws. We do not discriminate on the basis of race, color, national origin, age, disability sex, sexual orientation or gender identity.            Thank you!     Thank you for choosing [facility]  for your care. Our goal is always to provide you with excellent care. Hearing back from our patients is one way we can continue to improve our services. Please take a few minutes to complete the written survey that you may receive in the mail after your visit with us. Thank you!             Your Updated Medication List - Protect others around you: Learn how to safely use, store and throw away your medicines at www.disposemymeds.org.          This  list is accurate as of: 8/18/17  8:26 AM.  Always use your most recent med list.                   Brand Name Dispense Instructions for use Diagnosis    acetaminophen 500 MG tablet    TYLENOL     Take 500-1,000 mg by mouth every 6 hours as needed For cramps        triamcinolone 0.1 % cream    KENALOG    80 g    Apply sparingly to affected area 2 times daily as needed    Dermatitis       venlafaxine 150 MG 24 hr capsule    EFFEXOR-XR    30 capsule    Take 1 capsule (150 mg) by mouth daily    Menopausal syndrome (hot flashes)

## 2017-08-18 NOTE — PATIENT INSTRUCTIONS
UROLOGY CLINIC VISIT PATIENT INSTRUCTIONS    1) Return for the cystoscopy procedure with Dr. Farhana Brown (one of our urologists) to look into the bladder. Additional information on this procedure is listed below. Dr. Brown can also discuss options for treating your urinary incontinence at that time.     2) Work on the following kidney stone prevention measures:  - Drink plenty of fluids.  It is recommended that you drink at least 3 liters (100 ounces) per day, with a goal of making at least 2 liters (66 ounces) per day of urine.   - If alcoholic or caffeinated beverages are consumed then you need to drink water along with these beverages to maintain hydration.    - A few ounces of lemon juice concentrate can be diluted in your water each day to help prevent stones.    - You should limit intake of red meat, salt, and salty processed foods.    - Please maintain calcium intake in your diet through continued consumption of dairy products.   - You should limit foods that are high in oxalate such as spinach, sweet potatoes, dark chocolate, soy products, and some nuts such as peanuts.      CYSTOSCOPY    What is a Cystoscopy?  This is a procedure done to check for problems inside the bladder.  Problems may include polyps (growths), tumors, inflammation (swelling and redness) and other concerns.    The doctor inserts a thin tube (called a cystoscope) into the bladder.  The tube is about the size of a pencil.  We will give you numbing medicine to reduce the pain or discomfort you may feel.    The tube allows the doctor to:  The doctor will be able to see inside the bladder by filling the bladder with water.  The water makes it easier to see any problems that may be present.    If needed, the doctor may use the tube to:  The doctor is able to take tissue samples (biopsies).  Samples are sent to the lab for testing.  The doctor can also burn off any small growths or tumors that are found.  This is call fulguration.    How should  I get ready for the exam?  To prepare, stop taking any medications as instructed. Ask whether you should avoid eating or drinking anything after midnight before the procedure. Follow any other instructions your doctor gives you.    If you are having this procedure done at the clinic, you will be there for up to an hour.  You will receive care before and after the procedure.    Please tell your doctor if:  - You have a history of urinary tract infections.  - You know that you have a tumor in your bladder.  - You have bleeding problems.  - You have any allergies.  - You are or may be pregnant.      What happens after the exam?  You may go back to your normal diet and activity as you feel ready, unless your doctor tells you not to.    For the next two days, you may notice:  - Some blood in your urine.  - Some burning when you urinate (use the toilet).  - An urge to urinate more often.  - Bladder spasms.    These are normal after the procedure. They should go away on their own after a day or two.      - You can help to relieve the above listed symptoms by:  - Drinking 6 to 8 large glasses of water each day (includes drinks at meals).  This will help clear the urine.  - Take warm baths to relieve pain and bladder spasms.  Do not add anything to the bath water.  - Your doctor may prescribe pain medicine.  You may also take Tylenol (acetaminophen) for pain.    When should I call my doctor?  - A fever over 100.0 F (38 C) for more than a day.  (Before you call the doctor, check your temperature under your tongue.)  - Chills.  - Failure to urinate: No urine comes out when you try to use the toilet.  (Try soaking in a bathtub full of warm water.  If still no urine, call your doctor.)  - A lot of blood in the urine or blood clots larger than a nickel.  - Pain in the back or abdomen (belly / stomach area).  - Pain or spasms that are not relieved by warm tub baths and pain medicine.  - Severe pain, burning or other problems while  passing urine.  - Pain that gets worse after two days.      If you have any issues, questions or concerns in the meantime, do not hesitate to contact us at [phone] or via [employer].     It was a pleasure meeting with you today.  Thank you for allowing me and my team the privilege of caring for you today.  YOU are the reason we are here, and I truly hope we provided you with the excellent service you deserve.  Please let us know if there is anything else we can do for you so that we can be sure you are leaving completely satisfied with your care experience.    Cystoscopy    What is a Cystoscopy?  This is a procedure done to check for problems inside the bladder. Problems may include polyps (growths), tumors, inflammation (swelling and redness) and other concerns.    The doctor inserts a thin tube (called a cystoscope) into the bladder. The tube is about the size of a pencil. We will clean the area with special soap to remove bacteria and prevent post-procedure infection. We will give you numbing medicine (Lidocaine jelly) to reduce the pain or discomfort you may feel.    The tube allows the doctor to:  The doctor will be able to see inside the bladder by filling the bladder with water. The water makes it easier to see any problems that may be present.    If needed, the doctor may use the tube to:  The doctor is able to take tissue samples (biopsies). Samples are sent to the lab for testing.  The doctor can also burn off any small growths or tumors that are found. This is call fulguration.    How should I get ready for the exam?  There is no special preparation you need to do for this exam. Staff may ask for a urine sample prior to rooming you. You may eat and drink a normal diet before and after the exam. Medications may be taken as usual unless otherwise directed by the physician.    Please tell your doctor if:  You have a history of urinary tract infections.  You know that you have a tumor in your bladder.  You  have bleeding problems.  You have any allergies.  You are or may be pregnant.    What happens after the exam?  You may go back to your normal diet and activity as you feel ready, unless your doctor tells you not to.    For the next two days, you may notice:  Some blood in your urine.  Some burning when you urinate (use the toilet).  An urge to urinate more often.  Bladder spasms.    These are normal after the procedure. They should go away on their own after a day or two.      You can help to relieve the above listed symptoms by:  Drinking 6 to 8 large glasses of water each day (includes drinks at meals).  This will help clear the urine.  Take warm baths to relieve pain and bladder spasms.  Do not add anything to the bath water.  Your doctor may prescribe pain medicine.  You may also take Tylenol (acetaminophen) for pain.    When should I call my doctor?  A fever over 100.0 F (38 C) for more than a day.  (Before you call the doctor, check your temperature under your tongue.)  Chills.  Failure to urinate: No urine comes out when you try to use the toilet.  (Try soaking in a bathtub full of warm water.  If still no urine, call your doctor.)  A lot of blood in the urine or blood clots larger than a nickel.  Pain in the back or abdomen (belly / stomach area).  Pain or spasms that are not relieved by warm tub baths and pain medicine.  Severe pain, burning or other problems while passing urine.  Pain that gets worse after two days.

## 2017-08-18 NOTE — PROGRESS NOTES
"CC: gross hematuria.    HPI: It is a pleasure to see Ms. Char Newman, a very pleasant 57 year old female, asked to be seen in consultation by JUNIOR Song, CNP for evaluation of gross hematuria. Starting in the beginning of August, patient noticed some urinary frequency, urgency, and dysuria. Thought she may be having a UTI. This occurred for 2 weeks and then improved slightly. Also complained of some cramping in her low abdomen and low back. On 8/10/17, she felt the urge to void but was having a difficult time initiating her urine stream. Then states \"something gave way\" and she had a gush of urine with blood and pain as well. Was seen through primary care the following day where UA was completely negative (no blood or evidence for infection). CT urogram was ordered which showed tiny punctate stones in the right kidney but no obvious filling defects, hydronephrosis, or  mass.    Since then, she reports hematuria has resolved. Is s/p hysterectomy a few years ago for h/o menorrhagia and cysts. Since her hysterectomy, she reports ongoing issues with urinary urgency, intermittent dysuria, and incontinence - primarily with coughing, sneezing, exercising, but sometimes she will just leak without warning. Not wearing pads, but she states her incontinence prevents her from exercising. Does not do Kegel exercises. Currently voiding q4-5 hours during the day, nocturia x 2-3. She limits her fluids during the day to hopefully have less leakage. Denies pyuria, hesitancy, intermittency, feelings of incomplete emptying, or any recent history of urinary tract infections. She has no prior history of kidney stones.  - 3  with some tearing with the last 2 babies. Largest baby was 10 lb 5 oz.    Review of Diagnostics:  17 - UA with mucous, o/w wnl --> UC: <10k mixed  artie    CT ABDOMEN PELVIS W/O & W CONTRAST, 2017   IMPRESSION:   1. Punctate right renal stones without obstruction.  2. No urothelial masses " or filling defects.  3. Minimal diverticulosis without diverticulitis, small hiatal hernia,  stable changes of hysterectomy.    Hematuria Risk Factors:  Age >40: yes  Smoking history: no  Occupational exposure to chemicals or dyes (ie, benzenes, aromatic amines): no  History of urologic disorder or disease: no  History of irritative voiding symptoms: no  History of urinary tract infection: no  Analgesic abuse: no  History of pelvic irradiation: no    Past Medical History:   Diagnosis Date     Abnormal maternal glucose tolerance, complicating pregnancy, childbirth, or the puerperium, unspecified as to episode of care      Allergic rhinitis, cause unspecified     Allergic rhinitis     Arthritis 10 years    Father     Cancer (H) January 2012    Mother - Pancreatic Cancer     Depressive disorder, not elsewhere classified      EXCESSIVE MENSTRUATION 10/29/2007    Endometrial ablation done.     History of LAVH 11/27/13    for menorrhagia     NVD (normal vaginal delivery)     x3     Past Surgical History:   Procedure Laterality Date     BIOPSY  Several    Uterus     C LIGATE FALLOPIAN TUBE       COLONOSCOPY  2/17/2014    Procedure: COLONOSCOPY;  Colon cancer screening       HC HYSTEROSCOPY, SURGICAL; W/ ENDOMETRIAL ABLATION, ANY METHOD  11/09/07     HYSTEROSCOPY  07/28/06     LAPAROSCOPIC ASSISTED HYSTERECTOMY VAGINAL, BILATERAL SALPINGO-OOPHORECTOMY, COMBINED  11/27/2013    [facility] [place] [facility]     LAPAROSCOPIC ASSISTED HYSTERECTOMY VAGINAL, BILATERAL SALPINGO-OOPHORECTOMY, COMBINED  11/27/13    [facility]     oblation       Current Outpatient Prescriptions   Medication Sig Dispense Refill     venlafaxine (EFFEXOR-XR) 150 MG 24 hr capsule Take 1 capsule (150 mg) by mouth daily 30 capsule 5     triamcinolone (KENALOG) 0.1 % cream Apply sparingly to affected area 2 times daily as needed 80 g 1     acetaminophen (TYLENOL) 500 MG tablet Take 500-1,000 mg by mouth every 6 hours as needed For cramps       Allergies    Allergen Reactions     Penicillins Hives     Sulfa Drugs Hives     FAMILY HISTORY: There is no reported history of genitourinary carcinoma.  There is no history of urolithiasis.      SOCIAL HISTORY: The patient does not smoke cigarettes, no EtOH and no illicit drug use.    ROS: A comprehensive 14 point ROS was obtained and was positive for morning stiffness, achey joints and otherwise negative except for that outlined above in the HPI.    PHYSICAL EXAM:   There were no vitals filed for this visit.  GENERAL: Well groomed, well developed, well nourished female in NAD.  HEENT: AT, NC, EOMI bilaterally.  SKIN: Warm to touch, dry.  No visible rashes or lesions on examined areas.  RESP: No increased respiratory effort.  MS: Full ROM in extremities.  PELVIC: Deferred for now.   NEURO: Alert and oriented x 3.  PSYCH: Normal mood and affect, pleasant and agreeable during interview and exam.    PVR: 19 cc as measured by ultrasound    LABS:  Urine dip today shows small LE, o/w wnl. Micro pending.    IMAGING:   CT ABDOMEN PELVIS W/O & W CONTRAST, 8/11/2017   IMPRESSION:   1. Punctate right renal stones without obstruction.  2. No urothelial masses or filling defects.  3. Minimal diverticulosis without diverticulitis, small hiatal hernia,  stable changes of hysterectomy.      ASSESSMENT and PLAN:    Ms. Char Newman is a pleasant 57 year old female with one episode of painless gross hematuria, punctate stones in the right kidney, and mixed urinary incontinence (stress predominant) since hysterectomy a few years ago.     For hematuria, the differential diagnosis at this point includes stone disease, infection, vaginal contaminant, urothelial malignancy, renal disorder versus another yet unknown diagnosis. We discussed that the workup for hematuria includes UA/UC, cytology, CT urogram, and cystoscopy for intravesical examination. She would like to proceed with the remainder of the workup.  -Urine sent for cytology  -Schedule  cystoscopy with urologist  -Also discussed standard stone prevention measures given her punctate stones in the right kidney - suspect her main issue is dehydration given she is limiting her fluids due to incontinence (see patient instructions for details)    Given her urinary incontinence and other irritative voiding symptoms since hysterectomy, will schedule cystoscopy with Dr. Brown who she can also discuss her incontinence issues and possible treatment options with.  -We did briefly discuss referral to pelvic floor physical therapy, but she would like to complete hematuria workup before addressing this.     Thank you for allowing me to participate in Ms. Newman's care. I will keep you updated of her progress, but please do not hesitate to contact me with any questions.    About 25 minutes were spent with the patient today, > 50% in counseling and coordination of care.    Mary Gongora PA-C  Department of Urology

## 2017-08-18 NOTE — NURSING NOTE
"Char Newman's goals for this visit include:   Chief Complaint   Patient presents with     Consult     gross hematuria x2 with low back pain      She requests these members of her care team be copied on today's visit information: YES -     Referring Provider:  Dana Lobo, APRN CNP  [address]  [place] [address]    Initial /84 (BP Location: Left arm, Patient Position: Chair, Cuff Size: Adult Large)  Pulse 82  LMP 10/10/2013 Estimated body mass index is 28.34 kg/(m^2) as calculated from the following:    Height as of 4/5/17: 1.6 m (5' 3\").    Weight as of 8/11/17: 72.6 kg (160 lb).  BP completed using cuff size: large    post void residual - 19cc        "

## 2017-08-19 NOTE — PROGRESS NOTES
Jeane Newman,    Attached are your test results.  Your Dexa Bone Density test showed:    Osteopenia - Some loss of bone mineral density is indicated.  Exercise and Calcium/Vitamin D supplements are generally recommended.  Certain circumstances such as family history of osteoporosis may dictate more aggressive treatment with prescription medications.  The bone density test shows osteopenia. This is an intermediate category that is in between normal and osteoporosis.  People with osteopenia should work on taking in 9042-2500 mg of calcium with vitamin D daily. They should also be getting daily weight bearing exercise (walking works)    Please contact us if you have any questions.    Dana Lobo, CNP

## 2017-08-21 LAB — COPATH REPORT: NORMAL

## 2017-09-06 ENCOUNTER — TELEPHONE (OUTPATIENT)
Dept: UROLOGY | Facility: CLINIC | Age: 57
End: 2017-09-06

## 2017-09-06 NOTE — TELEPHONE ENCOUNTER
"Called and spoke to patient in preparation for upcoming visit with Dr. Brown. Patient denies symptoms of UTI at this time. Patient stated, \"I am doing well and I actually cancelled that appointment.\" Informed patient to call with any questions or concerns.    Cristina Ross RN, BSN      "

## 2017-09-22 DIAGNOSIS — N95.1 MENOPAUSAL SYNDROME (HOT FLASHES): ICD-10-CM

## 2017-09-22 RX ORDER — VENLAFAXINE HYDROCHLORIDE 150 MG/1
CAPSULE, EXTENDED RELEASE ORAL
Qty: 30 CAPSULE | Refills: 10 | Status: SHIPPED | OUTPATIENT
Start: 2017-09-22 | End: 2017-10-23

## 2017-09-22 NOTE — TELEPHONE ENCOUNTER
venlafaxine (EFFEXOR-XR) 150 MG 24 hr capsule  Last Written Prescription Date: 4/5/2017  Last Fill Quantity: 30, # refills: 5  Last Office Visit with [facility] primary care provider:  8/11/2017        Last PHQ-9 score on record= Menopausal syndrome (hot flashes) [N95.1]   PHQ-9 SCORE 4/5/2017   Total Score -   Total Score MyChart -   Total Score 3     Refilled per P protocol.    Sofy Grajeda RN

## 2017-10-23 ENCOUNTER — MYC MEDICAL ADVICE (OUTPATIENT)
Dept: PEDIATRICS | Facility: CLINIC | Age: 57
End: 2017-10-23

## 2017-10-23 DIAGNOSIS — N95.1 MENOPAUSAL SYNDROME (HOT FLASHES): ICD-10-CM

## 2017-10-23 RX ORDER — VENLAFAXINE HYDROCHLORIDE 75 MG/1
75 CAPSULE, EXTENDED RELEASE ORAL DAILY
Qty: 90 CAPSULE | Refills: 1 | Status: SHIPPED | OUTPATIENT
Start: 2017-10-23 | End: 2017-10-26

## 2017-10-26 ENCOUNTER — MYC MEDICAL ADVICE (OUTPATIENT)
Dept: PEDIATRICS | Facility: CLINIC | Age: 57
End: 2017-10-26

## 2017-10-26 DIAGNOSIS — N95.1 MENOPAUSAL SYNDROME (HOT FLASHES): ICD-10-CM

## 2017-10-26 RX ORDER — VENLAFAXINE HYDROCHLORIDE 75 MG/1
75 CAPSULE, EXTENDED RELEASE ORAL DAILY
Qty: 90 CAPSULE | Refills: 1 | Status: SHIPPED | OUTPATIENT
Start: 2017-10-26 | End: 2018-01-02

## 2017-10-26 NOTE — TELEPHONE ENCOUNTER
venlafaxine (EFFEXOR-XR) 75 MG 24 hr capsule 90 capsule 1 10/23/2017  --   Sig: Take 1 capsule (75 mg) by mouth daily   Class: E-Prescribe   Route: Oral   Order: [id number]   E-Prescribing Status: Receipt confirmed by pharmacy (10/23/2017  4:56 PM CDT)     Was sent to [facility] in [place], [place], resent to patient's preferred [facility] in [facility]. Sofy Grajeda RN

## 2018-01-02 ENCOUNTER — MYC REFILL (OUTPATIENT)
Dept: PEDIATRICS | Facility: CLINIC | Age: 58
End: 2018-01-02

## 2018-01-02 DIAGNOSIS — N95.1 MENOPAUSAL SYNDROME (HOT FLASHES): ICD-10-CM

## 2018-01-02 NOTE — TELEPHONE ENCOUNTER
"[facility]  Original authorizing provider: Braulio Cruz MD PhD    Char JANNETTE Newman would like a refill of the following medications:  venlafaxine (EFFEXOR-XR) 75 MG 24 hr capsule [Braulio Cruz MD PhD]    Preferred pharmacy: [id number] - [place], [place] - [address]    Comment:  Hello, Would it be possible to bump up the Venlafaxin to 150mg again? I thought I had my hot flashes under control, hence the reason to back it down....turns out I'm still \"hot!\" Will need refill of 150 mg - 90 day prescription to [facility] please. Thank you.  "

## 2018-01-03 ENCOUNTER — MYC REFILL (OUTPATIENT)
Dept: PEDIATRICS | Facility: CLINIC | Age: 58
End: 2018-01-03

## 2018-01-03 DIAGNOSIS — N95.1 MENOPAUSAL SYNDROME (HOT FLASHES): ICD-10-CM

## 2018-01-03 NOTE — TELEPHONE ENCOUNTER
Patient is requesting to increase Rx dose from 75 MG to 150 MG due to increased hot flashes. See MyChart Comment.     Patient is also requesting a 90 day refill.     I T'd up a new order to reflect the requesting dosage change. Please review, sign, and close encounter if deemed appropriate.     Last Office Visit with Dr. Cruz was 4/5/2017 and 8/11/2017 with DANIEL Whitehead RN

## 2018-01-03 NOTE — TELEPHONE ENCOUNTER
"[facility]  Original authorizing provider: Braulio Cruz MD PhD    Char Newman would like a refill of the following medications:  venlafaxine (EFFEXOR-XR) 75 MG 24 hr capsule [Braulio Cruz MD PhD]    Preferred [id number] [address] [facility]    Comment:  Jeane Cruz. Could we please bump the prescription for venlafaxine back to 150mg? The \"hot\" flashes are back with a venegence. Also, could the prescription be made out for 90 days worth? Thank you. Char Newman  "

## 2018-01-04 RX ORDER — VENLAFAXINE HYDROCHLORIDE 150 MG/1
150 CAPSULE, EXTENDED RELEASE ORAL DAILY
Qty: 90 CAPSULE | Refills: 0 | Status: SHIPPED | OUTPATIENT
Start: 2018-01-04 | End: 2018-04-18

## 2018-01-04 RX ORDER — VENLAFAXINE HYDROCHLORIDE 75 MG/1
150 CAPSULE, EXTENDED RELEASE ORAL DAILY
Qty: 90 CAPSULE | Refills: 0 | Status: SHIPPED | OUTPATIENT
Start: 2018-01-04 | End: 2018-01-04 | Stop reason: DRUGHIGH

## 2018-04-18 ENCOUNTER — MYC MEDICAL ADVICE (OUTPATIENT)
Dept: PEDIATRICS | Facility: CLINIC | Age: 58
End: 2018-04-18

## 2018-04-18 DIAGNOSIS — N95.1 MENOPAUSAL SYNDROME (HOT FLASHES): ICD-10-CM

## 2018-04-18 RX ORDER — VENLAFAXINE HYDROCHLORIDE 150 MG/1
150 CAPSULE, EXTENDED RELEASE ORAL DAILY
Qty: 30 CAPSULE | Refills: 0 | Status: SHIPPED | OUTPATIENT
Start: 2018-04-18 | End: 2018-05-07

## 2018-04-18 NOTE — TELEPHONE ENCOUNTER
venlafaxine (EFFEXOR-XR) 150 MG 24 hr capsule 90 capsule 0 1/4/2018  --   Sig: Take 1 capsule (150 mg) by mouth daily   Class: E-Prescribe   Notes to Pharmacy: Dose increased. Cancel previous prescriptions.     Patient due for annual physical. Annabel refill. Patient notified by [employer] message. Sofy Grajeda RN

## 2018-05-03 ENCOUNTER — DOCUMENTATION ONLY (OUTPATIENT)
Dept: LAB | Facility: CLINIC | Age: 58
End: 2018-05-03

## 2018-05-03 DIAGNOSIS — E78.5 HYPERLIPIDEMIA LDL GOAL <160: Primary | ICD-10-CM

## 2018-05-04 ENCOUNTER — RADIANT APPOINTMENT (OUTPATIENT)
Dept: MAMMOGRAPHY | Facility: CLINIC | Age: 58
End: 2018-05-04
Attending: INTERNAL MEDICINE
Payer: COMMERCIAL

## 2018-05-04 DIAGNOSIS — Z12.39 SCREENING BREAST EXAMINATION: ICD-10-CM

## 2018-05-04 PROCEDURE — 77063 BREAST TOMOSYNTHESIS BI: CPT | Performed by: RADIOLOGY

## 2018-05-04 PROCEDURE — 77067 SCR MAMMO BI INCL CAD: CPT | Performed by: RADIOLOGY

## 2018-05-07 ENCOUNTER — OFFICE VISIT (OUTPATIENT)
Dept: PEDIATRICS | Facility: CLINIC | Age: 58
End: 2018-05-07
Payer: COMMERCIAL

## 2018-05-07 ENCOUNTER — MYC MEDICAL ADVICE (OUTPATIENT)
Dept: PEDIATRICS | Facility: CLINIC | Age: 58
End: 2018-05-07

## 2018-05-07 VITALS
HEIGHT: 64 IN | BODY MASS INDEX: 26.63 KG/M2 | TEMPERATURE: 97.7 F | SYSTOLIC BLOOD PRESSURE: 126 MMHG | DIASTOLIC BLOOD PRESSURE: 82 MMHG | WEIGHT: 156 LBS | HEART RATE: 80 BPM | OXYGEN SATURATION: 98 %

## 2018-05-07 DIAGNOSIS — Z11.4 SCREENING FOR HIV (HUMAN IMMUNODEFICIENCY VIRUS): ICD-10-CM

## 2018-05-07 DIAGNOSIS — Z13.1 SCREENING FOR DIABETES MELLITUS: ICD-10-CM

## 2018-05-07 DIAGNOSIS — N95.1 MENOPAUSAL SYNDROME (HOT FLASHES): ICD-10-CM

## 2018-05-07 DIAGNOSIS — Z00.00 ROUTINE GENERAL MEDICAL EXAMINATION AT A HEALTH CARE FACILITY: Primary | ICD-10-CM

## 2018-05-07 DIAGNOSIS — Z11.59 ENCOUNTER FOR HEPATITIS C SCREENING TEST FOR LOW RISK PATIENT: ICD-10-CM

## 2018-05-07 DIAGNOSIS — E78.5 HYPERLIPIDEMIA LDL GOAL <160: ICD-10-CM

## 2018-05-07 DIAGNOSIS — F32.5 MAJOR DEPRESSION IN COMPLETE REMISSION (H): ICD-10-CM

## 2018-05-07 LAB
CHOLEST SERPL-MCNC: 177 MG/DL
GLUCOSE SERPL-MCNC: 96 MG/DL (ref 70–99)
HCV AB SERPL QL IA: NONREACTIVE
HDLC SERPL-MCNC: 41 MG/DL
HIV 1+2 AB+HIV1 P24 AG SERPL QL IA: NONREACTIVE
LDLC SERPL CALC-MCNC: 76 MG/DL
NONHDLC SERPL-MCNC: 136 MG/DL
TRIGL SERPL-MCNC: 299 MG/DL

## 2018-05-07 PROCEDURE — 86803 HEPATITIS C AB TEST: CPT | Performed by: INTERNAL MEDICINE

## 2018-05-07 PROCEDURE — 36415 COLL VENOUS BLD VENIPUNCTURE: CPT | Performed by: INTERNAL MEDICINE

## 2018-05-07 PROCEDURE — 87389 HIV-1 AG W/HIV-1&-2 AB AG IA: CPT | Performed by: INTERNAL MEDICINE

## 2018-05-07 PROCEDURE — 80061 LIPID PANEL: CPT | Performed by: INTERNAL MEDICINE

## 2018-05-07 PROCEDURE — 99396 PREV VISIT EST AGE 40-64: CPT | Performed by: INTERNAL MEDICINE

## 2018-05-07 PROCEDURE — 82947 ASSAY GLUCOSE BLOOD QUANT: CPT | Performed by: INTERNAL MEDICINE

## 2018-05-07 RX ORDER — VENLAFAXINE HYDROCHLORIDE 150 MG/1
150 CAPSULE, EXTENDED RELEASE ORAL DAILY
Qty: 90 CAPSULE | Refills: 1 | Status: SHIPPED | OUTPATIENT
Start: 2018-05-07 | End: 2018-11-02

## 2018-05-07 ASSESSMENT — ANXIETY QUESTIONNAIRES
1. FEELING NERVOUS, ANXIOUS, OR ON EDGE: NOT AT ALL
2. NOT BEING ABLE TO STOP OR CONTROL WORRYING: SEVERAL DAYS
7. FEELING AFRAID AS IF SOMETHING AWFUL MIGHT HAPPEN: NOT AT ALL
6. BECOMING EASILY ANNOYED OR IRRITABLE: NOT AT ALL
3. WORRYING TOO MUCH ABOUT DIFFERENT THINGS: SEVERAL DAYS
GAD7 TOTAL SCORE: 3
5. BEING SO RESTLESS THAT IT IS HARD TO SIT STILL: SEVERAL DAYS

## 2018-05-07 ASSESSMENT — PATIENT HEALTH QUESTIONNAIRE - PHQ9: 5. POOR APPETITE OR OVEREATING: NOT AT ALL

## 2018-05-07 NOTE — LETTER
My Depression Action Plan  Name: Char Newman   Date of Birth 1960  Date: 5/7/2018    My doctor: Braulio Cruz   My clinic: [address] [address] [phone]          GREEN    ZONE   Good Control    What it looks like:     Things are going generally well. You have normal up s and down s. You may even feel depressed from time to time, but bad moods usually last less than a day.   What you need to do:  1. Continue to care for yourself (see self care plan)  2. Check your depression survival kit and update it as needed  3. Follow your physician s recommendations including any medication.  4. Do not stop taking medication unless you consult with your physician first.           YELLOW         ZONE Getting Worse    What it looks like:     Depression is starting to interfere with your life.     It may be hard to get out of bed; you may be starting to isolate yourself from others.    Symptoms of depression are starting to last most all day and this has happened for several days.     You may have suicidal thoughts but they are not constant.   What you need to do:     1. Call your care team, your response to treatment will improve if you keep your care team informed of your progress. Yellow periods are signs an adjustment may need to be made.     2. Continue your self-care, even if you have to fake it!    3. Talk to someone in your support network    4. Open up your depression survival kit           RED    ZONE Medical Alert - Get Help    What it looks like:     Depression is seriously interfering with your life.     You may experience these or other symptoms: You can t get out of bed most days, can t work or engage in other necessary activities, you have trouble taking care of basic hygiene, or basic responsibilities, thoughts of suicide or death that will not go away, self-injurious behavior.     What you need to do:  1. Call your care team and request a same-day  appointment. If they are not available (weekends or after hours) call your local crisis line, emergency room or 911.      Electronically signed by: Braulio Cruz, May 7, 2018    Depression Self Care Plan / Survival Kit    Self-Care for Depression  Here s the deal. Your body and mind are really not as separate as most people think.  What you do and think affects how you feel and how you feel influences what you do and think. This means if you do things that people who feel good do, it will help you feel better.  Sometimes this is all it takes.  There is also a place for medication and therapy depending on how severe your depression is, so be sure to consult with your medical provider and/ or Behavioral Health Consultant if your symptoms are worsening or not improving.     In order to better manage my stress, I will:    Exercise  Get some form of exercise, every day. This will help reduce pain and release endorphins, the  feel good  chemicals in your brain. This is almost as good as taking antidepressants!  This is not the same as joining a gym and then never going! (they count on that by the way ) It can be as simple as just going for a walk or doing some gardening, anything that will get you moving.      Hygiene   Maintain good hygiene (Get out of bed in the morning, Make your bed, Brush your teeth, Take a shower, and Get dressed like you were going to work, even if you are unemployed).  If your clothes don't fit try to get ones that do.    Diet  I will strive to eat foods that are good for me, drink plenty of water, and avoid excessive sugar, caffeine, alcohol, and other mood-altering substances.  Some foods that are helpful in depression are: complex carbohydrates, B vitamins, flaxseed, fish or fish oil, fresh fruits and vegetables.    Psychotherapy  I agree to participate in Individual Therapy (if recommended).    Medication  If prescribed medications, I agree to take them.  Missing doses can result in serious side  effects.  I understand that drinking alcohol, or other illicit drug use, may cause potential side effects.  I will not stop my medication abruptly without first discussing it with my provider.    Staying Connected With Others  I will stay in touch with my friends, family members, and my primary care provider/team.    Use your imagination  Be creative.  We all have a creative side; it doesn t matter if it s oil painting, sand castles, or mud pies! This will also kick up the endorphins.    Witness Beauty  (AKA stop and smell the roses) Take a look outside, even in mid-winter. Notice colors, textures. Watch the squirrels and birds.     Service to others  Be of service to others.  There is always someone else in need.  By helping others we can  get out of ourselves  and remember the really important things.  This also provides opportunities for practicing all the other parts of the program.    Humor  Laugh and be silly!  Adjust your TV habits for less news and crime-drama and more comedy.    Control your stress  Try breathing deep, massage therapy, biofeedback, and meditation. Find time to relax each day.     My support system    Clinic Contact:  Phone number:    Contact 1:  Phone number:    Contact 2:  Phone number:    Christian/:  Phone number:    Therapist:  Phone number:    Local crisis center:    Phone number:    Other community support:  Phone number:

## 2018-05-07 NOTE — MR AVS SNAPSHOT
After Visit Summary   5/7/2018    Char Newman    MRN: [id number]           Patient Information     Date Of Birth          1960        Visit Information        Provider Department      5/7/2018 7:50 AM Braulio Cruz MD PhD M [facility]        Today's Diagnoses     Routine general medical examination at a health care facility    -  1    Major depression in complete remission (H)        Menopausal syndrome (hot flashes)        Screening for diabetes mellitus        Encounter for hepatitis C screening test for low risk patient        Screening for HIV (human immunodeficiency virus)          Care Instructions      Preventive Health Recommendations  Female Ages 50 - 64    Yearly exam: See your health care provider every year in order to  o Review health changes.   o Discuss preventive care.    o Review your medicines if your doctor has prescribed any.      Get a Pap test every three years (unless you have an abnormal result and your provider advises testing more often).    If you get Pap tests with HPV test, you only need to test every 5 years, unless you have an abnormal result.     You do not need a Pap test if your uterus was removed (hysterectomy) and you have not had cancer.    You should be tested each year for STDs (sexually transmitted diseases) if you're at risk.     Have a mammogram every 1 to 2 years.    Have a colonoscopy at age 50, or have a yearly FIT test (stool test). These exams screen for colon cancer.      Have a cholesterol test every 5 years, or more often if advised.    Have a diabetes test (fasting glucose) every three years. If you are at risk for diabetes, you should have this test more often.     If you are at risk for osteoporosis (brittle bone disease), think about having a bone density scan (DEXA).    Shots: Get a flu shot each year. Get a tetanus shot every 10 years.    Nutrition:     Eat at least 5 servings of fruits and vegetables each day.    Eat  whole-grain bread, whole-wheat pasta and brown rice instead of white grains and rice.    Talk to your provider about Calcium and Vitamin D.     Lifestyle    Exercise at least 150 minutes a week (30 minutes a day, 5 days a week). This will help you control your weight and prevent disease.    Limit alcohol to one drink per day.    No smoking.     Wear sunscreen to prevent skin cancer.     See your dentist every six months for an exam and cleaning.    See your eye doctor every 1 to 2 years.            Follow-ups after your visit        Who to contact     If you have questions or need follow up information about today's clinic visit or your schedule please contact [facility] directly at [phone].  Normal or non-critical lab and imaging results will be communicated to you by Pub[employer]hart, letter or phone within 4 business days after the clinic has received the results. If you do not hear from us within 7 days, please contact the clinic through [employer] or phone. If you have a critical or abnormal lab result, we will notify you by phone as soon as possible.  Submit refill requests through [employer] or call your pharmacy and they will forward the refill request to us. Please allow 3 business days for your refill to be completed.          Additional Information About Your Visit        PubNativeharDTT Information     [employer] gives you secure access to your electronic health record. If you see a primary care provider, you can also send messages to your care team and make appointments. If you have questions, please call your primary care clinic.  If you do not have a primary care provider, please call [phone] and they will assist you.      [employer] is an electronic gateway that provides easy, online access to your medical records. With [employer], you can request a clinic appointment, read your test results, renew a prescription or communicate with your care team.     To access your existing account, please contact your  "[facility] or call [phone] for assistance.        Care EveryWhere ID     This is your Care EveryWhere ID. This could be used by other organizations to access your [facility] medical records  [id number]        Your Vitals Were     Pulse Temperature Height Last Period Pulse Oximetry BMI (Body Mass Index)    80 97.7  F (36.5  C) (Temporal) 5' 4\" (1.626 m) 10/10/2013 98% 26.78 kg/m2       Blood Pressure from Last 3 Encounters:   05/07/18 126/82   08/18/17 131/84   08/11/17 116/72    Weight from Last 3 Encounters:   05/07/18 156 lb (70.8 kg)   08/11/17 160 lb (72.6 kg)   04/05/17 161 lb (73 kg)              We Performed the Following     DEPRESSION ACTION PLAN (DAP)     Glucose     Hepatitis C antibody     HIV Antigen Antibody Combo          Where to get your medicines      These medications were sent to [id number] - [place], [place] - [address]  [address], [facility] [address]     Phone:  [phone]     venlafaxine 150 MG 24 hr capsule          Primary Care Provider Office Phone # Fax #    Braulio Cruz MD PhD [phone]       [address]  [facility] [address]        Equal Access to Services     BRYNN RESENDIZ : Hadii aad ku hadasho Sobillali, waaxda luqadaha, qaybta kaalmada adealexey, [place]. So [facility] [phone].    ATENCIÓN: Si habla español, tiene a garcía disposición servicios gratuitos de asistencia lingüística. Llame al [phone].    We comply with applicable federal civil rights laws and [place] laws. We do not discriminate on the basis of race, color, national origin, age, disability, sex, sexual orientation, or gender identity.            Thank you!     Thank you for choosing [facility]  for your care. Our goal is always to provide you with excellent care. Hearing back from our patients is one way we can continue to improve our services. Please take a few minutes to " complete the written survey that you may receive in the mail after your visit with us. Thank you!             Your Updated Medication List - Protect others around you: Learn how to safely use, store and throw away your medicines at www.disposemymeds.org.          This list is accurate as of 5/7/18  8:25 AM.  Always use your most recent med list.                   Brand Name Dispense Instructions for use Diagnosis    triamcinolone 0.1 % cream    KENALOG    80 g    Apply sparingly to affected area 2 times daily as needed    Dermatitis       venlafaxine 150 MG 24 hr capsule    EFFEXOR-XR    90 capsule    Take 1 capsule (150 mg) by mouth daily    Menopausal syndrome (hot flashes)

## 2018-05-07 NOTE — PROGRESS NOTES
Dear Char,   Here are your recent results which are within the expected range.  Please continue with your current plan of care.     Please call or Mychart to our office if you have further questions.     Braulio Cruz MD-PhD

## 2018-05-07 NOTE — PROGRESS NOTES
SUBJECTIVE:   CC: Char Newman is an 57 year old woman who presents for preventive health visit.     Healthy Habits:    Do you get at least three servings of calcium containing foods daily (dairy, green leafy vegetables, etc.)? yes    Amount of exercise or daily activities, outside of work: 4 day(s) per week    Problems taking medications regularly No    Medication side effects: No    Have you had an eye exam in the past two years? yes    Do you see a dentist twice per year? no    Do you have sleep apnea, excessive snoring or daytime drowsiness?no      PROBLEMS TO ADD ON...  Went on Limu diet. Has lost weight and felt much better in terms of overall wellbeing.     Today's PHQ-2 Score:   PHQ-2 ( 1999 [employer]) 4/5/2017 1/12/2016   Q1: Little interest or pleasure in doing things 0 -   Q2: Feeling down, depressed or hopeless 1 -   PHQ-2 Score 1 -   Q1: Little interest or pleasure in doing things - Not at all   Q2: Feeling down, depressed or hopeless - Not at all   PHQ-2 Score - 0       Abuse: Current or Past(Physical, Sexual or Emotional)- No  Do you feel safe in your environment - Yes    Social History   Substance Use Topics     Smoking status: Never Smoker     Smokeless tobacco: Never Used      Comment: N/A     Alcohol use No     If you drink alcohol do you typically have >3 drinks per day or >7 drinks per week? No                     Reviewed orders with patient.  Reviewed health maintenance and updated orders accordingly - Yes  Labs reviewed in [facility]    Patient over age 50, mutual decision to screen reflected in health maintenance.    Pertinent mammograms are reviewed under the imaging tab.  History of abnormal Pap smear: NO - age 30- 65 PAP every 3 years recommended    Reviewed and updated as needed this visit by clinical staff  Tobacco  Meds  Med Hx  Surg Hx  Fam Hx  Soc Hx        Reviewed and updated as needed this visit by Provider            ROS:  CONSTITUTIONAL: NEGATIVE for fever, chills, change in  "weight  INTEGUMENTARY/SKIN: NEGATIVE for worrisome rashes, moles or lesions  EYES: NEGATIVE for vision changes or irritation  ENT: NEGATIVE for ear, mouth and throat problems  RESP: NEGATIVE for significant cough or SOB  BREAST: NEGATIVE for masses, tenderness or discharge  CV: NEGATIVE for chest pain, palpitations or peripheral edema  GI: NEGATIVE for nausea, abdominal pain, heartburn, or change in bowel habits  : NEGATIVE for unusual urinary or vaginal symptoms. No vaginal bleeding.  MUSCULOSKELETAL: NEGATIVE for significant arthralgias or myalgia  NEURO: NEGATIVE for weakness, dizziness or paresthesias  PSYCHIATRIC: NEGATIVE for changes in mood or affect     OBJECTIVE:   /82  Pulse 80  Temp 97.7  F (36.5  C) (Temporal)  Ht 5' 4\" (1.626 m)  Wt 156 lb (70.8 kg)  LMP 10/10/2013  SpO2 98%  BMI 26.78 kg/m2  EXAM:  GENERAL: healthy, alert and no distress  EYES: Eyes grossly normal to inspection, PERRL and conjunctivae and sclerae normal  HENT: ear canals and TM's normal, nose and mouth without ulcers or lesions  NECK: no adenopathy, no asymmetry, masses, or scars and thyroid normal to palpation  RESP: lungs clear to auscultation - no rales, rhonchi or wheezes  BREAST: normal without masses, tenderness or nipple discharge and no palpable axillary masses or adenopathy  CV: regular rate and rhythm, normal S1 S2, no S3 or S4, no murmur, click or rub, no peripheral edema and peripheral pulses strong  ABDOMEN: soft, nontender, no hepatosplenomegaly, no masses and bowel sounds normal  MS: no gross musculoskeletal defects noted, no edema  SKIN: no suspicious lesions or rashes  NEURO: Normal strength and tone, mentation intact and speech normal  PSYCH: mentation appears normal, affect normal/bright    ASSESSMENT/PLAN:       ICD-10-CM    1. Routine general medical examination at a health care facility Z00.00    2. Major depression in complete remission (H) F32.5    3. Menopausal syndrome (hot flashes) N95.1 " "venlafaxine (EFFEXOR-XR) 150 MG 24 hr capsule   4. Screening for diabetes mellitus Z13.1 Glucose   5. Encounter for hepatitis C screening test for low risk patient Z11.59 Hepatitis C antibody   6. Screening for HIV (human immunodeficiency virus) Z11.4 HIV Antigen Antibody Combo       COUNSELING:   Reviewed preventive health counseling, as reflected in patient instructions         reports that she has never smoked. She has never used smokeless tobacco.    Estimated body mass index is 26.78 kg/(m^2) as calculated from the following:    Height as of this encounter: 5' 4\" (1.626 m).    Weight as of this encounter: 156 lb (70.8 kg).   Weight management plan: has lost weight on Limu diet.     Counseling Resources:  ATP IV Guidelines  Pooled Cohorts Equation Calculator  Breast Cancer Risk Calculator  FRAX Risk Assessment  ICSI Preventive Guidelines  Dietary Guidelines for Americans, 2010  USDA's MyPlate  ASA Prophylaxis  Lung CA Screening    Braulio Cruz MD PhD  [facility]  "

## 2018-05-08 ASSESSMENT — PATIENT HEALTH QUESTIONNAIRE - PHQ9: SUM OF ALL RESPONSES TO PHQ QUESTIONS 1-9: 0

## 2018-05-08 ASSESSMENT — ANXIETY QUESTIONNAIRES: GAD7 TOTAL SCORE: 3

## 2018-05-08 NOTE — TELEPHONE ENCOUNTER
Routing [employer] message to Dr. Cruz  to please review when able.      Maren Busch RN, [facility]

## 2018-05-08 NOTE — PROGRESS NOTES
Dear Char,   Here are your recent results that we reviewed at your recent clinic visit.     Please call or Mychart if you have further questions.     Braulio Cruz MD-PhD

## 2018-09-27 ENCOUNTER — MYC MEDICAL ADVICE (OUTPATIENT)
Dept: PEDIATRICS | Facility: CLINIC | Age: 58
End: 2018-09-27

## 2018-09-27 DIAGNOSIS — N95.1 MENOPAUSAL FLUSHING: Primary | ICD-10-CM

## 2018-09-28 ENCOUNTER — TELEPHONE (OUTPATIENT)
Dept: PEDIATRICS | Facility: CLINIC | Age: 58
End: 2018-09-28

## 2018-09-28 RX ORDER — VENLAFAXINE HYDROCHLORIDE 37.5 MG/1
TABLET, EXTENDED RELEASE ORAL
Qty: 42 TABLET | Refills: 0 | Status: SHIPPED | OUTPATIENT
Start: 2018-09-28 | End: 2018-10-12

## 2018-09-28 NOTE — TELEPHONE ENCOUNTER
Called [facility], spoke to Frederick.  Gave him verbal order per Dr. Cruz.    Maren Busch RN, [facility]

## 2018-09-28 NOTE — TELEPHONE ENCOUNTER
Patient requesting medication be sent to [facility] in [facility].  Melissa Lea, [facility]

## 2018-09-28 NOTE — TELEPHONE ENCOUNTER
[facility] Call Center    Phone Message    May a detailed message be left on voicemail: yes    Reason for Call: Medication Question or concern regarding medication   Prescription Clarification  Name of Medication: venlafaxine (EFFEXOR-ER) 37.5 MG TB24 24 hr tablet [[id number]] (Order [id number]  Prescribing Provider:Dr. Cruz   Pharmacy: [facility] in [place] [phone]   What on the order needs clarification?Tablets are not covered by insurance but capsules are. Pharmacy states a verbal ok is fine.         Action Taken: Message routed to:  Primary Care p [phone]

## 2018-09-28 NOTE — TELEPHONE ENCOUNTER
Routing to Dr. Cruz to please advise if venlafaxine capsules ok.    Maren Busch RN, [facility]

## 2018-10-12 DIAGNOSIS — N95.1 MENOPAUSAL FLUSHING: Primary | ICD-10-CM

## 2018-10-12 RX ORDER — VENLAFAXINE HYDROCHLORIDE 37.5 MG/1
CAPSULE, EXTENDED RELEASE ORAL
Qty: 42 CAPSULE | Refills: 0 | Status: SHIPPED | OUTPATIENT
Start: 2018-10-12 | End: 2018-11-02

## 2018-10-12 RX ORDER — VENLAFAXINE HYDROCHLORIDE 37.5 MG/1
TABLET, EXTENDED RELEASE ORAL
Qty: 42 TABLET | Refills: 0 | Status: CANCELLED | OUTPATIENT
Start: 2018-10-12

## 2018-10-12 NOTE — TELEPHONE ENCOUNTER
New RX sent per original order below but with capsules per insurance coverage.    venlafaxine (EFFEXOR-ER) 37.5 MG TB24 24 hr tablet 42 tablet 0 2018  --      Si tablets daily for 2 weeks, then 1 tablet daily for 2 weeks, then go off     Class: E-Prescribe     Order: [id number]     E-Prescribing Status: Receipt confirmed by pharmacy (2018  8:28 AM CDT)       Printout Tracking      External Result Report       Medication Administration Instructions      2 tablets daily for 2 weeks, then 1 tablet daily for 2 weeks, then go off       Pharmacy      [id number] [place] [facility] [place], [place] - [address]       Associated Diagnoses      Menopausal flushing [N95.1]  - Primary             Nelli Oneill RN,   [facility]

## 2018-10-12 NOTE — TELEPHONE ENCOUNTER
Pending Prescriptions:                       Disp   Refills    venlafaxine (EFFEXOR-ER) 37.5 MG TB24 24 *42 tab*0            Si tablets daily for 2 weeks, then 1 tablet daily           for 2 weeks, then go off    Pharmacy requesting new Rx for capsules (covered by insurance) vs tablets (not covered by insurance).   Jazmine Epstein, CMA

## 2018-11-02 ENCOUNTER — E-VISIT (OUTPATIENT)
Dept: PEDIATRICS | Facility: CLINIC | Age: 58
End: 2018-11-02
Payer: COMMERCIAL

## 2018-11-02 ENCOUNTER — MYC MEDICAL ADVICE (OUTPATIENT)
Dept: PEDIATRICS | Facility: CLINIC | Age: 58
End: 2018-11-02

## 2018-11-02 DIAGNOSIS — F41.1 ANXIETY STATE: Primary | ICD-10-CM

## 2018-11-02 DIAGNOSIS — F32.5 MAJOR DEPRESSION IN COMPLETE REMISSION (H): Primary | ICD-10-CM

## 2018-11-02 PROCEDURE — 99207 ZZC NO CHARGE NURSE ONLY: CPT | Performed by: NURSE PRACTITIONER

## 2018-11-02 RX ORDER — VENLAFAXINE HYDROCHLORIDE 75 MG/1
75 CAPSULE, EXTENDED RELEASE ORAL DAILY
Qty: 90 CAPSULE | Refills: 1 | Status: SHIPPED | OUTPATIENT
Start: 2018-11-02 | End: 2019-03-01

## 2018-11-02 NOTE — TELEPHONE ENCOUNTER
I talked to the patient on phone. She wants to go back on Effexor at at medium dose. Feels her symptoms of depression are returning. Prescription of Effexor XR 75 mg once a day send to [facility] pharmacy. Advice f/u with Dr. Cruz.

## 2018-11-02 NOTE — TELEPHONE ENCOUNTER
Routed [employer] message to Dr. Cruz's covering providers.    Nelli Oneill RN,   [facility], [facility]

## 2018-11-28 RX ORDER — VENLAFAXINE HYDROCHLORIDE 37.5 MG/1
CAPSULE, EXTENDED RELEASE ORAL
Qty: 42 CAPSULE | Refills: 0 | OUTPATIENT
Start: 2018-11-28

## 2018-11-28 NOTE — TELEPHONE ENCOUNTER
venlafaxine (EFFEXOR-XR) 75 MG 24 hr capsule 90 capsule 1 11/2/2018  --   Sig - Route: Take 1 capsule (75 mg) by mouth daily - Oral       Serotonin-Norepinephrine Reuptake Inhibitors [id number]/27 1:57 AM   Normal serum creatinine on file in past 12 months    Blood pressure under 140/90 in past 12 months    Recent (12 mo) or future (30 days) visit within the authorizing provider's specialty    Patient is age 18 or older    No active pregnancy on record    No positive pregnancy test in past 12 months     Ph[facility]acy   [id number] [place] [facility] [place] [id number]     Our records indicate duplicate request. Same requesting pharmacy. Sofy Grajeda RN

## 2018-12-03 RX ORDER — VENLAFAXINE HYDROCHLORIDE 37.5 MG/1
CAPSULE, EXTENDED RELEASE ORAL
Qty: 42 CAPSULE | Refills: 0 | OUTPATIENT
Start: 2018-12-03

## 2018-12-03 NOTE — TELEPHONE ENCOUNTER
venlafaxine (EFFEXOR-XR) 75 MG 24 hr capsule 90 capsule 1 11/2/2018  --   Sig - Route: Take 1 capsule (75 mg) by mouth daily - Oral       Serotonin-Norepinephrine Reuptake Inhibitors [id number]/1 12:32 PM   Normal serum creatinine on file in past 12 months    Blood pressure under 140/90 in past 12 months    Recent (12 mo) or future (30 days) visit within the authorizing provider's specialty    Patient is age 18 or older    No active pregnancy on record    No positive pregnancy test in past 12 months     Pharmacy   [id number] [place] [facility] [place] [id number]     Our records indicate duplicate request. Same requesting pharmacy. Sofy Grajeda RN

## 2018-12-19 RX ORDER — VENLAFAXINE HYDROCHLORIDE 37.5 MG/1
CAPSULE, EXTENDED RELEASE ORAL
Qty: 42 CAPSULE | Refills: 0 | OUTPATIENT
Start: 2018-12-19

## 2018-12-19 NOTE — TELEPHONE ENCOUNTER
Disp Refills Start End NELLA   venlafaxine (EFFEXOR-XR) 75 MG 24 hr capsule 90 capsule 1 11/2/2018  --   Sig - Route: Take 1 capsule (75 mg) by mouth daily - Oral     Ph[facility]     [id number] [place] [facility] [place] [facility] [address]     Our records indicate duplicate request. Same requesting pharmacy. Sofy Grajeda RN

## 2019-03-01 ENCOUNTER — ANCILLARY PROCEDURE (OUTPATIENT)
Dept: GENERAL RADIOLOGY | Facility: CLINIC | Age: 59
End: 2019-03-01
Attending: NURSE PRACTITIONER
Payer: COMMERCIAL

## 2019-03-01 ENCOUNTER — OFFICE VISIT (OUTPATIENT)
Dept: PEDIATRICS | Facility: CLINIC | Age: 59
End: 2019-03-01
Payer: COMMERCIAL

## 2019-03-01 VITALS
RESPIRATION RATE: 16 BRPM | DIASTOLIC BLOOD PRESSURE: 82 MMHG | SYSTOLIC BLOOD PRESSURE: 143 MMHG | OXYGEN SATURATION: 97 % | HEART RATE: 93 BPM | WEIGHT: 162.6 LBS | TEMPERATURE: 97.6 F | BODY MASS INDEX: 27.91 KG/M2

## 2019-03-01 DIAGNOSIS — F32.5 MAJOR DEPRESSION IN COMPLETE REMISSION (H): Primary | ICD-10-CM

## 2019-03-01 DIAGNOSIS — M54.12 RADICULAR SYNDROME OF UPPER LIMBS: ICD-10-CM

## 2019-03-01 DIAGNOSIS — N95.1 MENOPAUSAL SYNDROME (HOT FLASHES): ICD-10-CM

## 2019-03-01 DIAGNOSIS — R25.1 TREMOR: ICD-10-CM

## 2019-03-01 PROCEDURE — 72040 X-RAY EXAM NECK SPINE 2-3 VW: CPT | Performed by: RADIOLOGY

## 2019-03-01 PROCEDURE — 99214 OFFICE O/P EST MOD 30 MIN: CPT | Performed by: NURSE PRACTITIONER

## 2019-03-01 RX ORDER — VENLAFAXINE HYDROCHLORIDE 37.5 MG/1
37.5 CAPSULE, EXTENDED RELEASE ORAL DAILY
Qty: 90 CAPSULE | Refills: 1 | Status: SHIPPED | OUTPATIENT
Start: 2019-03-01 | End: 2019-04-25

## 2019-03-01 RX ORDER — METHYLPREDNISOLONE 4 MG
TABLET, DOSE PACK ORAL
Qty: 21 TABLET | Refills: 0 | Status: SHIPPED | OUTPATIENT
Start: 2019-03-01 | End: 2019-08-22

## 2019-03-01 ASSESSMENT — PAIN SCALES - GENERAL: PAINLEVEL: SEVERE PAIN (7)

## 2019-03-01 NOTE — RESULT ENCOUNTER NOTE
Jeane Newman,    Attached are your test results.  Cervical spine shows degenerative changes.   I am thinking an MRI of the cervical spine is what we need   I will place order. Please call [phone] to schedule.     Please contact us if you have any questions.    Dana Lobo, CNP

## 2019-03-01 NOTE — PATIENT INSTRUCTIONS
PLAN:   1.   Symptomatic therapy suggested: Effexor will decrease to 37.5 a day for 4 weeks and then will try every other day for 4 weeks and then come off  And if having symptoms please let me know and will do Prozac 10 mg.  2.  Orders Placed This Encounter   Medications     venlafaxine (EFFEXOR-XR) 37.5 MG 24 hr capsule     Sig: Take 1 capsule (37.5 mg) by mouth daily     Dispense:  90 capsule     Refill:  1     methylPREDNISolone (MEDROL DOSEPAK) 4 MG tablet therapy pack     Sig: Follow package instructions     Dispense:  21 tablet     Refill:  0     Orders Placed This Encounter   Procedures     XR Cervical Spine 2/3 Views     NEUROLOGY ADULT REFERRAL       3. Patient needs to follow up in if no improvement,or sooner if worsening of symptoms or other symptoms develop.  FURTHER TESTING:       - xray neck   CONSULTATION/REFERRAL to neurology   Will follow up and/or notify patient of  results via My Chart to determine further need for followup  Consider MRI of cervical spine will depend on xray results

## 2019-03-01 NOTE — PROGRESS NOTES
SUBJECTIVE:   Char Newman is a 58 year old female who presents to clinic today for the following health issues:    Medication Followup of Effexor - Need refill  Taking Medication as prescribed: yes    Side Effects:  Increased fatigued    Medication Helping Symptoms:  yes   Has been on the Effexor 75 xr for hot flashes and would like to wean off   Ada like head was buzzy and could not focus     Concern - Back, bilateral hand/wrist pain  Onset: X 1 year    Description:   Achy and sharp; twitches, constant    Intensity: moderate    Progression of Symptoms:  worsening and constant    Accompanying Signs & Symptoms:  Tingling in right arm up elbow into neck. Spasms on right shoulder blade with shooting pain    Previous history of similar problem:   none    Precipitating factors:   Worsened by: repetitive motion    Alleviating factors:  Improved by: Massaging the areas    Therapies Tried and outcome: Aleve; take the edge off to stay sleeping  Was in a year ago having bilateral wrist pain of hands, wrist and will travel up her arm   Now has burning sensation in the right arm  By time goes to be pain into both hands and into her arms  Worse at night   Shoulders and neck bother her as well   Also has noticed some intermittent tremor in the last year  Father has a tremor and one time was worried was not parkinsons   She does notice it but her  does     Problem list and histories reviewed & adjusted, as indicated.  Additional history: as documented    Patient Active Problem List   Diagnosis     Allergic rhinitis     Anxiety state     HYPERLIPIDEMIA LDL GOAL <160     Urinary incontinence     Acne     Major depression in complete remission (H)     Family history of pancreatic cancer     Family history of breast cancer     History of LAVH     Past Surgical History:   Procedure Laterality Date     BIOPSY  Several    Uterus     C LIGATE FALLOPIAN TUBE       COLONOSCOPY  2/17/2014    Procedure: COLONOSCOPY;  Colon cancer  screening        HYSTEROSCOPY, SURGICAL; W/ ENDOMETRIAL ABLATION, ANY METHOD  11/09/07     HYSTEROSCOPY  07/28/06     LAPAROSCOPIC ASSISTED HYSTERECTOMY VAGINAL, BILATERAL SALPINGO-OOPHORECTOMY, COMBINED  11/27/2013    [facility]     LAPAROSCOPIC ASSISTED HYSTERECTOMY VAGINAL, BILATERAL SALPINGO-OOPHORECTOMY, COMBINED  11/27/13    [facility]     oblation         Social History     Tobacco Use     Smoking status: Never Smoker     Smokeless tobacco: Never Used     Tobacco comment: N/A   Substance Use Topics     Alcohol use: No     Family History   Problem Relation Age of Onset     Diabetes Maternal Grandfather         N/A     Cancer Paternal Grandmother         Skin & Breast     Cerebrovascular Disease Paternal Grandmother      Breast Cancer Paternal Grandmother      Cancer Father         Skin and throat cancer     Hypertension Mother      Cancer Mother         pancreatic cancer     Diabetes Mother         Started after pancreatic cancer surgery     Hypertension Maternal Grandmother      Breast Cancer Maternal Grandmother      Cancer Paternal Grandfather         Lung         Current Outpatient Medications   Medication Sig Dispense Refill     venlafaxine (EFFEXOR-XR) 75 MG 24 hr capsule Take 1 capsule (75 mg) by mouth daily 90 capsule 1     triamcinolone (KENALOG) 0.1 % cream Apply sparingly to affected area 2 times daily as needed (Patient not taking: Reported on 3/1/2019) 80 g 1     Allergies   Allergen Reactions     Penicillins Hives     Sulfa Drugs Hives     BP Readings from Last 3 Encounters:   03/01/19 143/82   05/07/18 126/82   08/18/17 131/84    Wt Readings from Last 3 Encounters:   03/01/19 73.8 kg (162 lb 9.6 oz)   05/07/18 70.8 kg (156 lb)   08/11/17 72.6 kg (160 lb)              Labs reviewed in EPIC    Reviewed and updated as needed this visit by clinical staff  Allergies  Meds       Reviewed and updated as needed this visit by Provider         ROS:  CONSTITUTIONAL:NEGATIVE for  fever, chills, change in weight  INTEGUMENTARY/SKIN: NEGATIVE for rash   RESP:NEGATIVE for significant cough or SOB  CV: NEGATIVE for chest pain, palpitations or peripheral edema  MUSCULOSKELETAL: POSITIVE  for neck pain and radicular pain upper extremities  and NEGATIVE for joint swelling  and joint warmth   NEURO: POSITIVE for numbness or tingling arms, paresthesias  and radicular pain  and NEGATIVE for involuntary movements, gait disturbance, loss of consciousness and syncope  PSYCHIATRIC: NEGATIVE for changes in mood or affect    OBJECTIVE:     /82 (BP Location: Right arm, Patient Position: Chair, Cuff Size: Adult Regular)   Pulse 93   Temp 97.6  F (36.4  C) (Oral)   Resp 16   Wt 73.8 kg (162 lb 9.6 oz)   LMP 10/10/2013   SpO2 97%   BMI 27.91 kg/m    Body mass index is 27.91 kg/m .  GENERAL APPEARANCE: alert, active and no distress  NECK: no adenopathy  RESP: lungs clear to auscultation - no rales, rhonchi or wheezes  CV: regular rates and rhythm  MS: extremities normal- no gross deformities noted and peripheral pulses normal  SKIN: no suspicious lesions or rashes  NEURO: mentation intact and speech normal  PSYCH: mentation appears normal and affect normal/bright  MENTAL STATUS EXAM:  Appearance/Behavior: No apparent distress, Casually groomed and Dressed appropriately for weather  Speech: Normal  Mood/Affect: normal affect  Insight: Adequate    Diagnostic Test Results:  Pending orders and results       ASSESSMENT/PLAN:     Char was seen today for pain.    Diagnoses and all orders for this visit:    Major depression in complete remission (H)  -     venlafaxine (EFFEXOR-XR) 37.5 MG 24 hr capsule; Take 1 capsule (37.5 mg) by mouth daily  Symptomatic therapy suggested: Effexor will decrease to 37.5 a day for 4 weeks and then will try every other day for 4 weeks and then come off  And if having symptoms please let me know and will do Prozac 10 mg.  Menopausal syndrome (hot flashes)    Radicular  syndrome of upper limbs  -     XR Cervical Spine 2/3 Views; Future  -     methylPREDNISolone (MEDROL DOSEPAK) 4 MG tablet therapy pack; Follow package instructions  -     MR Cervical Spine w/o Contrast; Future  -     ORTHO  REFERRAL    Tremor  -     NEUROLOGY ADULT REFERRAL    See Patient Instructions  Patient Instructions     PLAN:   1.   Symptomatic therapy suggested: Effexor will decrease to 37.5 a day for 4 weeks and then will try every other day for 4 weeks and then come off  And if having symptoms please let me know and will do Prozac 10 mg.  2.  Orders Placed This Encounter   Medications     venlafaxine (EFFEXOR-XR) 37.5 MG 24 hr capsule     Sig: Take 1 capsule (37.5 mg) by mouth daily     Dispense:  90 capsule     Refill:  1     methylPREDNISolone (MEDROL DOSEPAK) 4 MG tablet therapy pack     Sig: Follow package instructions     Dispense:  21 tablet     Refill:  0     Orders Placed This Encounter   Procedures     XR Cervical Spine 2/3 Views     NEUROLOGY ADULT REFERRAL       3. Patient needs to follow up in if no improvement,or sooner if worsening of symptoms or other symptoms develop.  FURTHER TESTING:       - xray neck   CONSULTATION/REFERRAL to neurology   Will follow up and/or notify patient of  results via My Chart to determine further need for followup  Consider MRI of cervical spine will depend on xray results     JUNIOR Rausch CNP  [facility]

## 2019-03-03 RX ORDER — VENLAFAXINE HYDROCHLORIDE 75 MG/1
75 CAPSULE, EXTENDED RELEASE ORAL DAILY
Refills: 1 | COMMUNITY
Start: 2019-01-27 | End: 2019-03-04 | Stop reason: DRUGHIGH

## 2019-03-03 NOTE — PROGRESS NOTES
Summary and Recommendations:     TREMOR - she does not have obvious parkinsonian or dystonic features at this time.   Most likely this is essential tremor.   No tests needed at this point or treatment except consider a thyroid at some point. Unlikely this is hyperthyroid   She is taking venlafaxine which can aggravate tremor.   She does drink only cup of coffee per day.   She is uncertain if alcohol helps her tremor.  Given her father has had a 20 year history of tremor, most likely this is essential or familial tremor.    She has mychart set up.     She has pain in her right arm and right leg and has some sensory changes in the right lower leg.   Pending cervical spine mri.     If additional input needed on the pain/sensory changes may consider having her see general neurology and consider mri l/s spine and/or emg studies. Not sure there is something coming from above the neck but her features are asymmetric and right sided - both arm and leg.   One could have her see physical therapy.     Sohan Solis MD  _____________________________________________________________________  PATIENT: Char Travis  58 year old female   : 1960  MADHU: 2019    Consult requested by pcp/other        Outside records reviewed and revealed inserted      History obtained from patient      History of Present Illness  . lives in [place]. jim travis spouse    59 yo woman with tremor.     Father has had tremor.   Told he did not have parkinson  Father has had tremor for 20 yrs  Bilateral hand tremor.   Does not drink anymore. Recovering alcoholic    Tremor was noticed by her  6 months ago  And she has not really noticed her head tremor.   She has some shakiness in her hands  She is right handed  There may be more in the tremor  She is an [employer] .   She is typing and writing and chopping.  She has not had a formal diagnosis of carpal tunnel  Had a neck mri done which was ordered by Dana Lobo  and has been followed by her and Dr. Cruz    She has had a hysterectomy and tubal in the past.     She is b eing treated for anxiety since having children.     Vision - no problems with her vision - she has had flashing and has had rare migraines.  Hearing is okay  Gi  - no problems  Lungs are fine  Blood pressure has been good  Endo - denies  Heme - none  Allergies - reviewed  Infections - denies  Steroid treatment for neck pain and radiates down her right arm and shoulder blade down her right leg  She has some numbness in her arm and leg   - both  Electric stabbing pain  No clear ameliorative or aggravating factors  - worse if no stretching.  Sleep - snores. Not clear talking in her sleep  No loss of smell.   Walking is okay but has some off center walking.   No falls.   Walking slower due to pain.   Has problems with her shoes off.   She has not had skin problems.   She has not had problems with bladder.   No urgency or frequency  Her mri done here today                     Medications            Methylprednisolone medrol        Venlafaxine effexor XR 37.5mg 24 hr capsule        Triamcinolone kenalog 0.1% cream Not using                                                                                                                                                                                     14 Review of systems  are negative except for   Patient Active Problem List   Diagnosis     HYPERLIPIDEMIA LDL GOAL <160     Family history of pancreatic cancer     Family history of breast cancer        Allergies   Allergen Reactions     Penicillins Hives     Sulfa Drugs Hives     Past Surgical History:   Procedure Laterality Date     BIOPSY  Several    Uterus     C LIGATE FALLOPIAN TUBE       COLONOSCOPY  2/17/2014    Procedure: COLONOSCOPY;  Colon cancer screening        HYSTEROSCOPY, SURGICAL; W/ ENDOMETRIAL ABLATION, ANY METHOD  11/09/07     HYSTEROSCOPY  07/28/06     LAPAROSCOPIC ASSISTED HYSTERECTOMY VAGINAL,  BILATERAL SALPINGO-OOPHORECTOMY, COMBINED  11/27/2013    [facility]     LAPAROSCOPIC ASSISTED HYSTERECTOMY VAGINAL, BILATERAL SALPINGO-OOPHORECTOMY, COMBINED  11/27/13    [facility]     oblation       Past Medical History:   Diagnosis Date     Abnormal maternal glucose tolerance, complicating pregnancy, childbirth, or the puerperium, unspecified as to episode of care      Allergic rhinitis, cause unspecified     Allergic rhinitis     Arthritis 10 years    Father     Cancer (H) January 2012    Mother - Pancreatic Cancer     Depressive disorder, not elsewhere classified      EXCESSIVE MENSTRUATION 10/29/2007    Endometrial ablation done.     History of LAVH 11/27/13    for menorrhagia     NVD (normal vaginal delivery)     x3     Social History     Socioeconomic History     Marital status:      Spouse name: Simon     Number of children: 3     Years of education: 15     Highest education level: Not on file   Occupational History     Occupation:      Comment: [employer]   Social Needs     Financial resource strain: Not on file     Food insecurity:     Worry: Not on file     Inability: Not on file     Transportation needs:     Medical: Not on file     Non-medical: Not on file   Tobacco Use     Smoking status: Never Smoker     Smokeless tobacco: Never Used     Tobacco comment: N/A   Substance and Sexual Activity     Alcohol use: No     Drug use: No     Sexual activity: No     Partners: Male   Lifestyle     Physical activity:     Days per week: Not on file     Minutes per session: Not on file     Stress: Not on file   Relationships     Social connections:     Talks on phone: Not on file     Gets together: Not on file     Attends Congregation service: Not on file     Active member of club or organization: Not on file     Attends meetings of clubs or organizations: Not on file     Relationship status: Not on file     Intimate partner violence:     Fear of current  or ex partner: Not on file     Emotionally abused: Not on file     Physically abused: Not on file     Forced sexual activity: Not on file   Other Topics Concern     Parent/sibling w/ CABG, MI or angioplasty before 65F 55M? No     Comment: N/A      Service No     Blood Transfusions No     Caffeine Concern No     Occupational Exposure No     Hobby Hazards No     Sleep Concern Yes     Comment: patient has a hard time staying asleep     Stress Concern No     Weight Concern Yes     Special Diet No     Back Care No     Exercise Yes     Comment: x5 days a week     Bike Helmet No     Seat Belt Yes     Self-Exams Yes   Social History Narrative    . lives in [place]. jim travis spouse     Family History   Problem Relation Age of Onset     Diabetes Maternal Grandfather         N/A     Cancer Paternal Grandmother         Skin & Breast     Cerebrovascular Disease Paternal Grandmother      Breast Cancer Paternal Grandmother      Cancer Father         Skin and throat cancer     Hypertension Mother      Cancer Mother         pancreatic cancer     Diabetes Mother         Started after pancreatic cancer surgery     Hypertension Maternal Grandmother      Breast Cancer Maternal Grandmother      Cancer Paternal Grandfather         Lung     Current Outpatient Medications   Medication Sig Dispense Refill     methylPREDNISolone (MEDROL DOSEPAK) 4 MG tablet therapy pack Follow package instructions 21 tablet 0     triamcinolone (KENALOG) 0.1 % cream Apply sparingly to affected area 2 times daily as needed (Patient not taking: Reported on 3/1/2019) 80 g 1     venlafaxine (EFFEXOR-XR) 37.5 MG 24 hr capsule Take 1 capsule (37.5 mg) by mouth daily 90 capsule 1     venlafaxine (EFFEXOR-XR) 75 MG 24 hr capsule Take 75 mg by mouth daily  1     Examination  B/P: Data Unavailable, T: Data Unavailable, P: Data Unavailable, R: Data Unavailable 0 lbs 0 oz  Last menstrual period 10/10/2013., There is no height or weight on file to  calculate BMI.    Vitals signs were added and reviewed if not above. Please refer to the chart from this visit.    General examination: well developed, nourished and normal affect  Carotid: No bruits. Chest CTA, Heart regular without gallops or murmurs. Abdomen soft nontender, no masses, bowel sounds intact. Periphery: normal pulses without edema. No skin lesions. MENTAL STATUS:  Alert, oriented x3.  Speech fluent with normal naming, repetition, comprehension.  Good right-left orientation, Can remember 3/3 objects.  5/5 on spelling world backwards.  CRANIAL NERVES:  Disks flat. Pupils are equal, round, reactive to light.  Normal vascularity and fields. Extraocular movements full.  Facial sensation and movement normal.  Hearing intact. Palate moves symmetrically.  Tongue midline.  Sternocleidomastoid and trapezius strength intact.  Neck strength was normal.  NEUROLOGIC:  Tone: normal. Motor in upper and lower extremities. 5/5.  Reflexes 3/4.  Toe signs downgoing.  Good finger-nose-finger, fine finger movement, heel-shin maneuver, sensation to light touch, position sense and vibration and temperature was normal - and has some changes in pp in the webspace of the right foot and possibly on the bottom on right foot and along the right lateral lower leg.  Gait normal. Romberg and postural stability intact.  Tremor slight postural and action bilateral hand tremor and head tremor - no no

## 2019-03-04 ENCOUNTER — OFFICE VISIT (OUTPATIENT)
Dept: NEUROLOGY | Facility: CLINIC | Age: 59
End: 2019-03-04
Attending: NURSE PRACTITIONER
Payer: COMMERCIAL

## 2019-03-04 ENCOUNTER — ANCILLARY PROCEDURE (OUTPATIENT)
Dept: MRI IMAGING | Facility: CLINIC | Age: 59
End: 2019-03-04
Attending: NURSE PRACTITIONER
Payer: COMMERCIAL

## 2019-03-04 VITALS
WEIGHT: 160.8 LBS | OXYGEN SATURATION: 99 % | DIASTOLIC BLOOD PRESSURE: 86 MMHG | HEART RATE: 71 BPM | BODY MASS INDEX: 27.6 KG/M2 | SYSTOLIC BLOOD PRESSURE: 135 MMHG

## 2019-03-04 DIAGNOSIS — R25.1 TREMOR: Primary | ICD-10-CM

## 2019-03-04 DIAGNOSIS — M54.12 RADICULAR SYNDROME OF UPPER LIMBS: ICD-10-CM

## 2019-03-04 PROCEDURE — 99204 OFFICE O/P NEW MOD 45 MIN: CPT | Performed by: PSYCHIATRY & NEUROLOGY

## 2019-03-04 PROCEDURE — 72141 MRI NECK SPINE W/O DYE: CPT | Performed by: RADIOLOGY

## 2019-03-04 ASSESSMENT — PAIN SCALES - GENERAL: PAINLEVEL: NO PAIN (0)

## 2019-03-04 NOTE — PATIENT INSTRUCTIONS
TREMOR - she does not have obvious parkinsonian or dystonic features at this time.   Most likely this is essential tremor.   No tests needed at this point or treatment except consider a thyroid at some point. Unlikely this is hyperthyroid   She is taking venlafaxine which can aggravate tremor.   She does drink only cup of coffee per day.   She is uncertain if alcohol helps her tremor.  Given her father has had a 20 year history of tremor, most likely this is essential or familial tremor.    She has mychart set up.     She has pain in her right arm and right leg and has some sensory changes in the right lower leg.   Pending cervical spine mri.     If additional input needed on the pain/sensory changes may consider having her see general neurology and consider mri l/s spine and/or emg studies. Not sure there is something coming from above the neck but her features are asymmetric and right sided - both arm and leg.   One could have her see physical therapy.

## 2019-03-04 NOTE — Clinical Note
3/4/2019         RE: Char Newman  [address] [id number]        Dear Colleague,    Thank you for referring your patient, Char Newman, to the [facility]. Please see a copy of my visit note below.    No notes on file    Again, thank you for allowing me to participate in the care of your patient.        Sincerely,        Sohan Solis MD

## 2019-03-04 NOTE — LETTER
3/4/2019       RE: Char Travis  [address] [id number]     Dear Colleague,    Thank you for referring your patient, Char Travis, to the [facility]. Please see a copy of my visit note below.    Summary and Recommendations:     TREMOR - she does not have obvious parkinsonian or dystonic features at this time.   Most likely this is essential tremor.   No tests needed at this point or treatment except consider a thyroid at some point. Unlikely this is hyperthyroid   She is taking venlafaxine which can aggravate tremor.   She does drink only cup of coffee per day.   She is uncertain if alcohol helps her tremor.  Given her father has had a 20 year history of tremor, most likely this is essential or familial tremor.    She has mychart set up.     She has pain in her right arm and right leg and has some sensory changes in the right lower leg.   Pending cervical spine mri.     If additional input needed on the pain/sensory changes may consider having her see general neurology and consider mri l/s spine and/or emg studies. Not sure there is something coming from above the neck but her features are asymmetric and right sided - both arm and leg.   One could have her see physical therapy.     Sohan Solis MD  _____________________________________________________________________  PATIENT: Char Travis  58 year old female   : 1960  MADHU: 2019    Consult requested by pcp/other        Outside records reviewed and revealed inserted      History obtained from patient      History of Present Illness  . lives in [place]. jim travis spouse    59 yo woman with tremor.     Father has had tremor.   Told he did not have parkinson  Father has had tremor for 20 yrs  Bilateral hand tremor.   Does not drink anymore. Recovering alcoholic    Tremor was noticed by her  6 months ago  And she has not really noticed her head tremor.    She has some shakiness in her hands  She is right handed  There may be more in the tremor  She is an [employer] school .   She is typing and writing and chopping.  She has not had a formal diagnosis of carpal tunnel  Had a neck mri done which was ordered by Dana Lobo and has been followed by her and Dr. Cruz    She has had a hysterectomy and tubal in the past.     She is b eing treated for anxiety since having children.     Vision - no problems with her vision - she has had flashing and has had rare migraines.  Hearing is okay  Gi  - no problems  Lungs are fine  Blood pressure has been good  Endo - denies  Heme - none  Allergies - reviewed  Infections - denies  Steroid treatment for neck pain and radiates down her right arm and shoulder blade down her right leg  She has some numbness in her arm and leg   - both  Electric stabbing pain  No clear ameliorative or aggravating factors  - worse if no stretching.  Sleep - snores. Not clear talking in her sleep  No loss of smell.   Walking is okay but has some off center walking.   No falls.   Walking slower due to pain.   Has problems with her shoes off.   She has not had skin problems.   She has not had problems with bladder.   No urgency or frequency  Her mri done here today                     Medications            Methylprednisolone medrol        Venlafaxine effexor XR 37.5mg 24 hr capsule        Triamcinolone kenalog 0.1% cream Not using                                                                                                                                                                                     14 Review of systems  are negative except for   Patient Active Problem List   Diagnosis     HYPERLIPIDEMIA LDL GOAL <160     Family history of pancreatic cancer     Family history of breast cancer        Allergies   Allergen Reactions     Penicillins Hives     Sulfa Drugs Hives     Past Surgical History:   Procedure Laterality Date     BIOPSY   Several    Uterus     C LIGATE FALLOPIAN TUBE       COLONOSCOPY  2/17/2014    Procedure: COLONOSCOPY;  Colon cancer screening       HC HYSTEROSCOPY, SURGICAL; W/ ENDOMETRIAL ABLATION, ANY METHOD  11/09/07     HYSTEROSCOPY  07/28/06     LAPAROSCOPIC ASSISTED HYSTERECTOMY VAGINAL, BILATERAL SALPINGO-OOPHORECTOMY, COMBINED  11/27/2013    [facility]     LAPAROSCOPIC ASSISTED HYSTERECTOMY VAGINAL, BILATERAL SALPINGO-OOPHORECTOMY, COMBINED  11/27/13    [facility]     oblation       Past Medical History:   Diagnosis Date     Abnormal maternal glucose tolerance, complicating pregnancy, childbirth, or the puerperium, unspecified as to episode of care      Allergic rhinitis, cause unspecified     Allergic rhinitis     Arthritis 10 years    Father     Cancer (H) January 2012    Mother - Pancreatic Cancer     Depressive disorder, not elsewhere classified      EXCESSIVE MENSTRUATION 10/29/2007    Endometrial ablation done.     History of LAVH 11/27/13    for menorrhagia     NVD (normal vaginal delivery)     x3     Social History     Socioeconomic History     Marital status:      Spouse name: Simon     Number of children: 3     Years of education: 15     Highest education level: Not on file   Occupational History     Occupation:      Comment: [employer]   Social Needs     Financial resource strain: Not on file     Food insecurity:     Worry: Not on file     Inability: Not on file     Transportation needs:     Medical: Not on file     Non-medical: Not on file   Tobacco Use     Smoking status: Never Smoker     Smokeless tobacco: Never Used     Tobacco comment: N/A   Substance and Sexual Activity     Alcohol use: No     Drug use: No     Sexual activity: No     Partners: Male   Lifestyle     Physical activity:     Days per week: Not on file     Minutes per session: Not on file     Stress: Not on file   Relationships     Social connections:     Talks on phone: Not on  file     Gets together: Not on file     Attends Zoroastrianism service: Not on file     Active member of club or organization: Not on file     Attends meetings of clubs or organizations: Not on file     Relationship status: Not on file     Intimate partner violence:     Fear of current or ex partner: Not on file     Emotionally abused: Not on file     Physically abused: Not on file     Forced sexual activity: Not on file   Other Topics Concern     Parent/sibling w/ CABG, MI or angioplasty before 65F 55M? No     Comment: N/A      Service No     Blood Transfusions No     Caffeine Concern No     Occupational Exposure No     Hobby Hazards No     Sleep Concern Yes     Comment: patient has a hard time staying asleep     Stress Concern No     Weight Concern Yes     Special Diet No     Back Care No     Exercise Yes     Comment: x5 days a week     Bike Helmet No     Seat Belt Yes     Self-Exams Yes   Social History Narrative    . lives in [place]. jim travis spouse     Family History   Problem Relation Age of Onset     Diabetes Maternal Grandfather         N/A     Cancer Paternal Grandmother         Skin & Breast     Cerebrovascular Disease Paternal Grandmother      Breast Cancer Paternal Grandmother      Cancer Father         Skin and throat cancer     Hypertension Mother      Cancer Mother         pancreatic cancer     Diabetes Mother         Started after pancreatic cancer surgery     Hypertension Maternal Grandmother      Breast Cancer Maternal Grandmother      Cancer Paternal Grandfather         Lung     Current Outpatient Medications   Medication Sig Dispense Refill     methylPREDNISolone (MEDROL DOSEPAK) 4 MG tablet therapy pack Follow package instructions 21 tablet 0     triamcinolone (KENALOG) 0.1 % cream Apply sparingly to affected area 2 times daily as needed (Patient not taking: Reported on 3/1/2019) 80 g 1     venlafaxine (EFFEXOR-XR) 37.5 MG 24 hr capsule Take 1 capsule (37.5 mg) by mouth daily 90  capsule 1     venlafaxine (EFFEXOR-XR) 75 MG 24 hr capsule Take 75 mg by mouth daily  1     Examination  B/P: Data Unavailable, T: Data Unavailable, P: Data Unavailable, R: Data Unavailable 0 lbs 0 oz  Last menstrual period 10/10/2013., There is no height or weight on file to calculate BMI.    Vitals signs were added and reviewed if not above. Please refer to the chart from this visit.    General examination: well developed, nourished and normal affect  Carotid: No bruits. Chest CTA, Heart regular without gallops or murmurs. Abdomen soft nontender, no masses, bowel sounds intact. Periphery: normal pulses without edema. No skin lesions. MENTAL STATUS:  Alert, oriented x3.  Speech fluent with normal naming, repetition, comprehension.  Good right-left orientation, Can remember 3/3 objects.  5/5 on spelling world backwards.  CRANIAL NERVES:  Disks flat. Pupils are equal, round, reactive to light.  Normal vascularity and fields. Extraocular movements full.  Facial sensation and movement normal.  Hearing intact. Palate moves symmetrically.  Tongue midline.  Sternocleidomastoid and trapezius strength intact.  Neck strength was normal.  NEUROLOGIC:  Tone: normal. Motor in upper and lower extremities. 5/5.  Reflexes 3/4.  Toe signs downgoing.  Good finger-nose-finger, fine finger movement, heel-shin maneuver, sensation to light touch, position sense and vibration and temperature was normal - and has some changes in pp in the webspace of the right foot and possibly on the bottom on right foot and along the right lateral lower leg.  Gait normal. Romberg and postural stability intact.  Tremor slight postural and action bilateral hand tremor and head tremor - no no             Again, thank you for allowing me to participate in the care of your patient.      Sincerely,    Sohan Solis MD

## 2019-03-05 NOTE — RESULT ENCOUNTER NOTE
Jeane Newman,    Attached are your test results.  Looks like some disc bulging and narrowing that I think may be causing symptoms you are having  Am referring you to specialist and see what they think    Please call [phone] to make appointment  if you do not hear from referrals in the next few days.    Please contact us if you have any questions.    Dana Lobo, CNP

## 2019-04-19 ENCOUNTER — HEALTH MAINTENANCE LETTER (OUTPATIENT)
Age: 59
End: 2019-04-19

## 2019-04-25 DIAGNOSIS — F32.5 MAJOR DEPRESSION IN COMPLETE REMISSION (H): ICD-10-CM

## 2019-04-25 RX ORDER — VENLAFAXINE HYDROCHLORIDE 75 MG/1
CAPSULE, EXTENDED RELEASE ORAL
Qty: 90 CAPSULE | Refills: 1 | OUTPATIENT
Start: 2019-04-25

## 2019-04-25 RX ORDER — VENLAFAXINE HYDROCHLORIDE 37.5 MG/1
37.5 CAPSULE, EXTENDED RELEASE ORAL DAILY
Qty: 30 CAPSULE | Refills: 0 | Status: SHIPPED | OUTPATIENT
Start: 2019-04-25 | End: 2019-05-22

## 2019-04-25 NOTE — TELEPHONE ENCOUNTER
Patient called back, states that she misunderstood the weaning off instructions. She thought she was going to stay at the 37.5 mg for 90 days, then 37.5 mg every other day for 90 days. Discussed instructions as noted on previous office visit dated 3/1/18:    PLAN:   1.   Symptomatic therapy suggested: Effexor will decrease to 37.5 a day for 4 weeks and then will try every other day for 4 weeks and then come off  And if having symptoms please let me know and will do Prozac 10 mg.    Patient will take 37.5 mg every other day for 4 weeks and then follow up with us. Will send for another 30 day supply to get her through the weaning process.    Maria Eugenia Delong RN   .[facility]

## 2019-04-25 NOTE — TELEPHONE ENCOUNTER
Per chart review, patient was weaning off of venlafaxine. Called the patient for verification, left a message to call back.     Maria Eugenia Delong RN   [facility]

## 2019-05-22 ENCOUNTER — MYC REFILL (OUTPATIENT)
Dept: PEDIATRICS | Facility: CLINIC | Age: 59
End: 2019-05-22

## 2019-05-22 DIAGNOSIS — F32.5 MAJOR DEPRESSION IN COMPLETE REMISSION (H): ICD-10-CM

## 2019-05-22 RX ORDER — VENLAFAXINE HYDROCHLORIDE 37.5 MG/1
37.5 CAPSULE, EXTENDED RELEASE ORAL DAILY
Qty: 90 CAPSULE | Refills: 1 | Status: SHIPPED | OUTPATIENT
Start: 2019-05-22 | End: 2019-08-22 | Stop reason: ALTCHOICE

## 2019-05-22 NOTE — TELEPHONE ENCOUNTER
Approved for 6 months she is overdue for physical. I have not seen her since 1/2018. Saw other providers in between. Schedule for physical within the next 6 months.

## 2019-08-19 ENCOUNTER — TELEPHONE (OUTPATIENT)
Dept: PEDIATRICS | Facility: CLINIC | Age: 59
End: 2019-08-19

## 2019-08-19 NOTE — PATIENT INSTRUCTIONS
Make appointment(s) for:   -- clinic follow up in 1 month.   -- get fasting labs.   -- mammogram      Check with your insurance regarding coverage for Shingrix (RZV) - the new shingles vaccine.       Medication(s) prescribed today:    Orders Placed This Encounter   Medications     DULoxetine (CYMBALTA) 30 MG capsule     Sig: Take 1 capsule (30 mg) by mouth daily     Dispense:  30 capsule     Refill:  0           Preventive Health Recommendations  Female Ages 50 - 64    Yearly exam: See your health care provider every year in order to  o Review health changes.   o Discuss preventive care.    o Review your medicines if your doctor has prescribed any.      Get a Pap test every three years (unless you have an abnormal result and your provider advises testing more often).    If you get Pap tests with HPV test, you only need to test every 5 years, unless you have an abnormal result.     You do not need a Pap test if your uterus was removed (hysterectomy) and you have not had cancer.    You should be tested each year for STDs (sexually transmitted diseases) if you're at risk.     Have a mammogram every 1 to 2 years.    Have a colonoscopy at age 50, or have a yearly FIT test (stool test). These exams screen for colon cancer.      Have a cholesterol test every 5 years, or more often if advised.    Have a diabetes test (fasting glucose) every three years. If you are at risk for diabetes, you should have this test more often.     If you are at risk for osteoporosis (brittle bone disease), think about having a bone density scan (DEXA).    Shots: Get a flu shot each year. Get a tetanus shot every 10 years.    Nutrition:     Eat at least 5 servings of fruits and vegetables each day.    Eat whole-grain bread, whole-wheat pasta and brown rice instead of white grains and rice.    Get adequate Calcium and Vitamin D.     Lifestyle    Exercise at least 150 minutes a week (30 minutes a day, 5 days a week). This will help you control  your weight and prevent disease.    Limit alcohol to one drink per day.    No smoking.     Wear sunscreen to prevent skin cancer.     See your dentist every six months for an exam and cleaning.    See your eye doctor every 1 to 2 years.

## 2019-08-19 NOTE — PROGRESS NOTES
"   SUBJECTIVE:   CC: Char Newman is an 59 year old woman who presents for preventive health visit.     Healthy Habits:    Do you get at least three servings of calcium containing foods daily (dairy, green leafy vegetables, etc.)? yes    Amount of exercise or daily activities, outside of work: 2-3 day(s) per week    Problems taking medications regularly No    Medication side effects: No    Have you had an eye exam in the past two years? yes    Do you see a dentist twice per year? yes    Do you have sleep apnea, excessive snoring or daytime drowsiness?no      {additional problems to add (Optional):332897}    Today's PHQ-2 Score:   PHQ-2 ( 1999 Pfizer) 8/19/2019 5/7/2018   Q1: Little interest or pleasure in doing things 1 0   Q2: Feeling down, depressed or hopeless 1 0   PHQ-2 Score 2 0   Q1: Little interest or pleasure in doing things Several days -   Q2: Feeling down, depressed or hopeless Several days -   PHQ-2 Score 2 -       Abuse: Current or Past(Physical, Sexual or Emotional)- {YES/NO/NA:683303}  Do you feel safe in your environment? {YES/NO/NA:036102}    Social History     Tobacco Use     Smoking status: Never Smoker     Smokeless tobacco: Never Used     Tobacco comment: N/A   Substance Use Topics     Alcohol use: No     If you drink alcohol do you typically have >3 drinks per day or >7 drinks per week? No                     Reviewed orders with patient.  Reviewed health maintenance and updated orders accordingly - {Yes/No:181968::\"Yes\"}  {Chronicprobdata (Optional):170870}    {Mammo Decision Support (Optional):742987}    Pertinent mammograms are reviewed under the imaging tab.  History of abnormal Pap smear: {PAP HX:767183}  PAP / HPV Latest Ref Rng & Units 1/15/2016 12/21/2012 12/16/2011   PAP - NIL NIL NIL   HPV 16 DNA NEG Negative - -   HPV 18 DNA NEG Negative - -   OTHER HR HPV NEG Negative - -     Reviewed and updated as needed this visit by clinical staff         Reviewed and updated as needed this " "visit by Provider        {HISTORY OPTIONS (Optional):732375}    ROS:  { :020239}    OBJECTIVE:   LMP 10/10/2013   EXAM:  {Exam Choices:732575}    {Diagnostic Test Results (Optional):727479::\"Diagnostic Test Results:\",\"Labs reviewed in Epic\"}    ASSESSMENT/PLAN:   {Diag Picklist:975913}    COUNSELING:   {FEMALE COUNSELING MESSAGES:737250::\"Reviewed preventive health counseling, as reflected in patient instructions\"}    Estimated body mass index is 27.6 kg/m  as calculated from the following:    Height as of 5/7/18: 1.626 m (5' 4\").    Weight as of 3/4/19: 72.9 kg (160 lb 12.8 oz).    {Weight Management Plan (ACO) Complete if BMI is abnormal-  Ages 18-64  BMI >24.9.  Age 65+ with BMI <23 or >30 (Optional):786998}     reports that she has never smoked. She has never used smokeless tobacco.  {Tobacco Cessation -- Complete if patient is a smoker (Optional):965204}    Counseling Resources:  ATP IV Guidelines  Pooled Cohorts Equation Calculator  Breast Cancer Risk Calculator  FRAX Risk Assessment  ICSI Preventive Guidelines  Dietary Guidelines for Americans, 2010  USDA's MyPlate  ASA Prophylaxis  Lung CA Screening    Braulio Cruz MD PhD  [facility]  "

## 2019-08-22 ENCOUNTER — OFFICE VISIT (OUTPATIENT)
Dept: PEDIATRICS | Facility: CLINIC | Age: 59
End: 2019-08-22
Payer: COMMERCIAL

## 2019-08-22 VITALS
WEIGHT: 161.9 LBS | DIASTOLIC BLOOD PRESSURE: 100 MMHG | TEMPERATURE: 97.8 F | OXYGEN SATURATION: 98 % | BODY MASS INDEX: 27.64 KG/M2 | HEART RATE: 78 BPM | SYSTOLIC BLOOD PRESSURE: 141 MMHG | HEIGHT: 64 IN

## 2019-08-22 DIAGNOSIS — E78.1 HYPERTRIGLYCERIDEMIA: ICD-10-CM

## 2019-08-22 DIAGNOSIS — F33.0 DEPRESSION, MAJOR, RECURRENT, MILD (H): ICD-10-CM

## 2019-08-22 DIAGNOSIS — Z13.1 SCREENING FOR DIABETES MELLITUS: ICD-10-CM

## 2019-08-22 DIAGNOSIS — Z12.31 ENCOUNTER FOR SCREENING MAMMOGRAM FOR BREAST CANCER: ICD-10-CM

## 2019-08-22 DIAGNOSIS — Z00.00 ROUTINE GENERAL MEDICAL EXAMINATION AT A HEALTH CARE FACILITY: Primary | ICD-10-CM

## 2019-08-22 DIAGNOSIS — R03.0 ELEVATED BLOOD PRESSURE READING WITHOUT DIAGNOSIS OF HYPERTENSION: ICD-10-CM

## 2019-08-22 PROCEDURE — 99214 OFFICE O/P EST MOD 30 MIN: CPT | Mod: 25 | Performed by: INTERNAL MEDICINE

## 2019-08-22 PROCEDURE — 99396 PREV VISIT EST AGE 40-64: CPT | Performed by: INTERNAL MEDICINE

## 2019-08-22 RX ORDER — DULOXETIN HYDROCHLORIDE 30 MG/1
30 CAPSULE, DELAYED RELEASE ORAL DAILY
Qty: 30 CAPSULE | Refills: 0 | Status: SHIPPED | OUTPATIENT
Start: 2019-08-22 | End: 2019-09-18

## 2019-08-22 ASSESSMENT — ANXIETY QUESTIONNAIRES
5. BEING SO RESTLESS THAT IT IS HARD TO SIT STILL: MORE THAN HALF THE DAYS
7. FEELING AFRAID AS IF SOMETHING AWFUL MIGHT HAPPEN: NOT AT ALL
1. FEELING NERVOUS, ANXIOUS, OR ON EDGE: NOT AT ALL
GAD7 TOTAL SCORE: 2
3. WORRYING TOO MUCH ABOUT DIFFERENT THINGS: NOT AT ALL
6. BECOMING EASILY ANNOYED OR IRRITABLE: NOT AT ALL
2. NOT BEING ABLE TO STOP OR CONTROL WORRYING: NOT AT ALL

## 2019-08-22 ASSESSMENT — MIFFLIN-ST. JEOR: SCORE: 1296.43

## 2019-08-22 ASSESSMENT — PATIENT HEALTH QUESTIONNAIRE - PHQ9
SUM OF ALL RESPONSES TO PHQ QUESTIONS 1-9: 12
5. POOR APPETITE OR OVEREATING: NOT AT ALL

## 2019-08-22 NOTE — NURSING NOTE
"Chief Complaint   Patient presents with     Physical     AFE       Initial BP (!) 141/100 (BP Location: Right arm, Patient Position: Sitting, Cuff Size: Adult Regular)   Pulse 78   Temp 97.8  F (36.6  C) (Temporal)   Ht 1.613 m (5' 3.5\")   Wt 73.4 kg (161 lb 14.4 oz)   LMP 10/10/2013   SpO2 98%   Breastfeeding? No   BMI 28.23 kg/m   Estimated body mass index is 28.23 kg/m  as calculated from the following:    Height as of this encounter: 1.613 m (5' 3.5\").    Weight as of this encounter: 73.4 kg (161 lb 14.4 oz).  Medication Reconciliation: complete      CAMRON Martinez      "

## 2019-08-22 NOTE — PROGRESS NOTES
SUBJECTIVE:   CC: Char Newman is an 59 year old woman who presents for preventive health visit.     Healthy Habits:     Getting at least 3 servings of Calcium per day:  Yes    Bi-annual eye exam:  Yes    Dental care twice a year:  Yes    Sleep apnea or symptoms of sleep apnea:  None    Diet:  Other    Frequency of exercise:  2-3 days/week    Duration of exercise:  30-45 minutes    Taking medications regularly:  Yes    Medication side effects:  None    PHQ-2 Total Score: 2    Additional concerns today:  No    Depression and Anxiety Follow-Up  Increase stress at work. More depressed.     She has also had increase body aches and achy joints. She had autoimmune work up in 2017, negative. Recently saw neurology for tremor, told she had no tremor. Neurologist reviewed her spine MRI, told her she has aging changes.       How are you doing with your depression since your last visit? No change    How are you doing with your anxiety since your last visit?  No change    Are you having other symptoms that might be associated with depression or anxiety? No    Have you had a significant life event? No     Do you have any concerns with your use of alcohol or other drugs? No    Social History     Tobacco Use     Smoking status: Never Smoker     Smokeless tobacco: Never Used     Tobacco comment: N/A   Substance Use Topics     Alcohol use: No     Drug use: No     PHQ 4/5/2017 5/7/2018 8/22/2019   PHQ-9 Total Score 3 0 12   Q9: Thoughts of better off dead/self-harm past 2 weeks Not at all Not at all Several days     OLI-7 SCORE 4/5/2017 5/7/2018 8/22/2019   Total Score - - -   Total Score 13 3 2     No flowsheet data found.  In the past two weeks have you had thoughts of suicide or self-harm?  Yes  In the past two weeks have you thought of a plan or intent to harm yourself? No.  Do you have concerns about your personal safety or the safety of others?   No    Suicide Assessment Five-step Evaluation and Treatment (SAFE-T)    Today's  PHQ-2 Score:   PHQ-2 ( 1999 Pfizer) 8/22/2019   Q1: Little interest or pleasure in doing things 1   Q2: Feeling down, depressed or hopeless 1   PHQ-2 Score 2   Q1: Little interest or pleasure in doing things -   Q2: Feeling down, depressed or hopeless -   PHQ-2 Score -       Abuse: Current or Past(Physical, Sexual or Emotional)- No  Do you feel safe in your environment? Yes    Social History     Tobacco Use     Smoking status: Never Smoker     Smokeless tobacco: Never Used     Tobacco comment: N/A   Substance Use Topics     Alcohol use: No     If you drink alcohol do you typically have >3 drinks per day or >7 drinks per week? No      Reviewed orders with patient.  Reviewed health maintenance and updated orders accordingly - Yes      Mammogram Screening: Patient over age 50, mutual decision to screen reflected in health maintenance.    Pertinent mammograms are reviewed under the imaging tab.  History of abnormal Pap smear: NO - age 30-65 PAP every 5 years with negative HPV co-testing recommended  PAP / HPV Latest Ref Rng & Units 1/15/2016 12/21/2012 12/16/2011   PAP - NIL NIL NIL   HPV 16 DNA NEG Negative - -   HPV 18 DNA NEG Negative - -   OTHER HR HPV NEG Negative - -     Reviewed and updated as needed this visit by clinical staff  Tobacco  Allergies  Meds  Med Hx  Surg Hx  Fam Hx  Soc Hx        Reviewed and updated as needed this visit by Provider  Meds            Review of Systems  CONSTITUTIONAL: NEGATIVE for fever, chills, change in weight  INTEGUMENTARY/SKIN: NEGATIVE for worrisome rashes, moles or lesions  EYES: NEGATIVE for vision changes or irritation  ENT: NEGATIVE for ear, mouth and throat problems  RESP: NEGATIVE for significant cough or SOB  BREAST: NEGATIVE for masses, tenderness or discharge  CV: NEGATIVE for chest pain, palpitations or peripheral edema  GI: NEGATIVE for nausea, abdominal pain, heartburn, or change in bowel habits  : NEGATIVE for unusual urinary or vaginal symptoms. No  "vaginal bleeding.  MUSCULOSKELETAL: NEGATIVE for significant arthralgias or myalgia  NEURO: NEGATIVE for weakness, dizziness or paresthesias  PSYCHIATRIC: depression getting worse     OBJECTIVE:   BP (!) 141/100 (BP Location: Right arm, Patient Position: Sitting, Cuff Size: Adult Regular)   Pulse 78   Temp 97.8  F (36.6  C) (Temporal)   Ht 1.613 m (5' 3.5\")   Wt 73.4 kg (161 lb 14.4 oz)   LMP 10/10/2013   SpO2 98%   Breastfeeding? No   BMI 28.23 kg/m    Physical Exam  GENERAL: healthy, alert and no distress  EYES: Eyes grossly normal to inspection, PERRL and conjunctivae and sclerae normal  HENT: ear canals and TM's normal, nose and mouth without ulcers or lesions  NECK: no adenopathy, no asymmetry, masses, or scars and thyroid normal to palpation  RESP: lungs clear to auscultation - no rales, rhonchi or wheezes  BREAST: normal without masses, tenderness or nipple discharge and no palpable axillary masses or adenopathy  CV: regular rate and rhythm, normal S1 S2, no S3 or S4, no murmur, click or rub, no peripheral edema and peripheral pulses strong  ABDOMEN: soft, nontender, no hepatosplenomegaly, no masses and bowel sounds normal  MS: no gross musculoskeletal defects noted, no edema  SKIN: no suspicious lesions or rashes  NEURO: Normal strength and tone, mentation intact and speech normal      Diagnostic Test Results:  No results found for this or any previous visit (from the past 24 hour(s)).    ASSESSMENT/PLAN:       ICD-10-CM    1. Routine general medical examination at a health care facility Z00.00    2. Depression, major, recurrent, mild (H) F33.0 DULoxetine (CYMBALTA) 30 MG capsule   3. Screening for diabetes mellitus Z13.1 Glucose   4. Hypertriglyceridemia E78.1 Lipid panel reflex to direct LDL Fasting   5. Encounter for screening mammogram for breast cancer Z12.31 MA Screening Digital Bilateral   6. Elevated blood pressure reading without diagnosis of hypertension R03.0      -- depression: Was on " "Effexor for hot flashes.  In light of her body aches, we discussed changing Effexor to duloxetine to see if this may help with depression and her body aches.  Start with duloxetine 20 mg daily  --Elevated blood pressure: Patient reported she just had a argument at the office today.  She is still feeling unsettled from that.  No prior history of hypertension.  --Follow-up in 1 month to recheck    COUNSELING:  Reviewed preventive health counseling, as reflected in patient instructions    Estimated body mass index is 28.23 kg/m  as calculated from the following:    Height as of this encounter: 1.613 m (5' 3.5\").    Weight as of this encounter: 73.4 kg (161 lb 14.4 oz).    Weight management plan: not able to lose weight.      reports that she has never smoked. She has never used smokeless tobacco.      Counseling Resources:  ATP IV Guidelines  Pooled Cohorts Equation Calculator  Breast Cancer Risk Calculator  FRAX Risk Assessment  ICSI Preventive Guidelines  Dietary Guidelines for Americans, 2010  USDA's MyPlate  ASA Prophylaxis  Lung CA Screening    Braulio Cruz MD PhD  [facility]  "

## 2019-08-23 ASSESSMENT — ANXIETY QUESTIONNAIRES: GAD7 TOTAL SCORE: 2

## 2019-08-27 ENCOUNTER — ANCILLARY PROCEDURE (OUTPATIENT)
Dept: MAMMOGRAPHY | Facility: CLINIC | Age: 59
End: 2019-08-27
Attending: INTERNAL MEDICINE
Payer: COMMERCIAL

## 2019-08-27 DIAGNOSIS — Z12.31 ENCOUNTER FOR SCREENING MAMMOGRAM FOR BREAST CANCER: ICD-10-CM

## 2019-08-27 DIAGNOSIS — Z13.1 SCREENING FOR DIABETES MELLITUS: ICD-10-CM

## 2019-08-27 DIAGNOSIS — E78.1 HYPERTRIGLYCERIDEMIA: ICD-10-CM

## 2019-08-27 LAB
CHOLEST SERPL-MCNC: 185 MG/DL
GLUCOSE SERPL-MCNC: 96 MG/DL (ref 70–99)
HDLC SERPL-MCNC: 50 MG/DL
LDLC SERPL CALC-MCNC: 99 MG/DL
NONHDLC SERPL-MCNC: 135 MG/DL
TRIGL SERPL-MCNC: 180 MG/DL

## 2019-08-27 PROCEDURE — 77063 BREAST TOMOSYNTHESIS BI: CPT | Performed by: STUDENT IN AN ORGANIZED HEALTH CARE EDUCATION/TRAINING PROGRAM

## 2019-08-27 PROCEDURE — 77067 SCR MAMMO BI INCL CAD: CPT | Performed by: STUDENT IN AN ORGANIZED HEALTH CARE EDUCATION/TRAINING PROGRAM

## 2019-08-27 PROCEDURE — 80061 LIPID PANEL: CPT | Performed by: INTERNAL MEDICINE

## 2019-08-27 PROCEDURE — 82947 ASSAY GLUCOSE BLOOD QUANT: CPT | Performed by: INTERNAL MEDICINE

## 2019-08-27 PROCEDURE — 36415 COLL VENOUS BLD VENIPUNCTURE: CPT | Performed by: INTERNAL MEDICINE

## 2019-08-27 NOTE — RESULT ENCOUNTER NOTE
Dear Char,   Your recent lab results showed the following:  --glucose is normal.  -- lipid panel much improved. Continue with the good work with healthy eating.     Please call or Mychart to our office if you have further questions.     Braulio Cruz MD-PhD

## 2019-09-18 ENCOUNTER — MYC REFILL (OUTPATIENT)
Dept: PEDIATRICS | Facility: CLINIC | Age: 59
End: 2019-09-18

## 2019-09-18 DIAGNOSIS — F33.0 DEPRESSION, MAJOR, RECURRENT, MILD (H): ICD-10-CM

## 2019-09-18 RX ORDER — DULOXETIN HYDROCHLORIDE 30 MG/1
CAPSULE, DELAYED RELEASE ORAL
Qty: 30 CAPSULE | Refills: 0 | Status: SHIPPED | OUTPATIENT
Start: 2019-09-18 | End: 2019-10-02

## 2019-09-18 RX ORDER — DULOXETIN HYDROCHLORIDE 30 MG/1
30 CAPSULE, DELAYED RELEASE ORAL DAILY
Qty: 30 CAPSULE | Refills: 0 | Status: CANCELLED | OUTPATIENT
Start: 2019-09-18

## 2019-09-18 NOTE — LETTER
September 18, 2019      Char Newman  [address] [address]              Dear Char,    We recently received a refill request from your pharmacy requesting a refill of : Cymbalta.    A review of your chart indicates that your last office visit was on 8/22/19.  An appointment is required in 1 month with your provider to follow up on your new medication. We have authorized a one time 30 day refill of your medication to allow time for you to schedule.     Please call the clinic at [phone], or log in to your [employer] account at [email or web address] to schedule your appointment within that time frame so there is no delay in next month's refill.    Thank you for taking an active role in your healthcare.    Sincerely,          Braulio Cruz MD    If you need assistance with your [employer] log in, please call [phone].

## 2019-09-28 ENCOUNTER — HEALTH MAINTENANCE LETTER (OUTPATIENT)
Age: 59
End: 2019-09-28

## 2019-10-02 ENCOUNTER — OFFICE VISIT (OUTPATIENT)
Dept: PEDIATRICS | Facility: CLINIC | Age: 59
End: 2019-10-02
Payer: COMMERCIAL

## 2019-10-02 VITALS
TEMPERATURE: 98.5 F | OXYGEN SATURATION: 99 % | DIASTOLIC BLOOD PRESSURE: 87 MMHG | HEART RATE: 86 BPM | SYSTOLIC BLOOD PRESSURE: 137 MMHG | WEIGHT: 159 LBS | BODY MASS INDEX: 27.72 KG/M2

## 2019-10-02 DIAGNOSIS — F33.0 DEPRESSION, MAJOR, RECURRENT, MILD (H): ICD-10-CM

## 2019-10-02 DIAGNOSIS — Z23 FLU VACCINE NEED: ICD-10-CM

## 2019-10-02 DIAGNOSIS — R52 GENERALIZED BODY ACHES: Primary | ICD-10-CM

## 2019-10-02 DIAGNOSIS — E73.9 LACTOSE INTOLERANCE: ICD-10-CM

## 2019-10-02 PROCEDURE — 36415 COLL VENOUS BLD VENIPUNCTURE: CPT | Performed by: INTERNAL MEDICINE

## 2019-10-02 PROCEDURE — 99214 OFFICE O/P EST MOD 30 MIN: CPT | Mod: 25 | Performed by: INTERNAL MEDICINE

## 2019-10-02 PROCEDURE — 82306 VITAMIN D 25 HYDROXY: CPT | Performed by: INTERNAL MEDICINE

## 2019-10-02 PROCEDURE — 90682 RIV4 VACC RECOMBINANT DNA IM: CPT | Performed by: INTERNAL MEDICINE

## 2019-10-02 PROCEDURE — 90471 IMMUNIZATION ADMIN: CPT | Performed by: INTERNAL MEDICINE

## 2019-10-02 RX ORDER — DULOXETIN HYDROCHLORIDE 60 MG/1
60 CAPSULE, DELAYED RELEASE ORAL DAILY
Qty: 30 CAPSULE | Refills: 1 | Status: SHIPPED | OUTPATIENT
Start: 2019-10-02 | End: 2019-12-02

## 2019-10-02 ASSESSMENT — ANXIETY QUESTIONNAIRES
2. NOT BEING ABLE TO STOP OR CONTROL WORRYING: NOT AT ALL
3. WORRYING TOO MUCH ABOUT DIFFERENT THINGS: NOT AT ALL
6. BECOMING EASILY ANNOYED OR IRRITABLE: NOT AT ALL
1. FEELING NERVOUS, ANXIOUS, OR ON EDGE: NOT AT ALL
GAD7 TOTAL SCORE: 3
7. FEELING AFRAID AS IF SOMETHING AWFUL MIGHT HAPPEN: NOT AT ALL
5. BEING SO RESTLESS THAT IT IS HARD TO SIT STILL: SEVERAL DAYS

## 2019-10-02 ASSESSMENT — PATIENT HEALTH QUESTIONNAIRE - PHQ9
5. POOR APPETITE OR OVEREATING: MORE THAN HALF THE DAYS
SUM OF ALL RESPONSES TO PHQ QUESTIONS 1-9: 10

## 2019-10-02 NOTE — PATIENT INSTRUCTIONS
Make appointment(s) for:   -- clinic follow up in 6 weeks.         Medication(s) prescribed today:    Orders Placed This Encounter   Medications     DULoxetine (CYMBALTA) 60 MG capsule     Sig: Take 1 capsule (60 mg) by mouth daily     Dispense:  30 capsule     Refill:  1

## 2019-10-02 NOTE — PROGRESS NOTES
Subjective     Char Newman is a 59 year old female who presents to clinic today for the following health issues:    HPI   Depression Followup    How are you doing with your depression since your last visit? No change    Are you having other symptoms that might be associated with depression? No    Have you had a significant life event?  No     Are you feeling anxious or having panic attacks?   No    Do you have any concerns with your use of alcohol or other drugs? No     At her last visit, we switch Effexor to duloxetine to help with her depression as well as generalized body aches.  Her mood is improved.  She does not notice any benefit in terms of body aches.  Denies side effects with taking duloxetine.    She wants to get her vitamin D checked.  She is lactose intolerant.  Does not take dairy products.  Does not take vitamin D/calcium supplements.    Social History     Tobacco Use     Smoking status: Never Smoker     Smokeless tobacco: Never Used     Tobacco comment: N/A   Substance Use Topics     Alcohol use: No     Drug use: No     PHQ 4/5/2017 5/7/2018 8/22/2019   PHQ-9 Total Score 3 0 12   Q9: Thoughts of better off dead/self-harm past 2 weeks Not at all Not at all Several days     OLI-7 SCORE 4/5/2017 5/7/2018 8/22/2019   Total Score - - -   Total Score 13 3 2     No flowsheet data found.  In the past two weeks have you had thoughts of suicide or self-harm?  No.    Do you have concerns about your personal safety or the safety of others?   No    Suicide Assessment Five-step Evaluation and Treatment (SAFE-T)      How many servings of fruits and vegetables do you eat daily?  4    On average, how many sweetened beverages do you drink each day (soda, juice, sweet tea, etc)?   0-1    How many days per week do you miss taking your medication? 0        ROS:  Constitutional, HEENT, cardiovascular, pulmonary, gi and gu systems are negative, except as otherwise noted.       No current outpatient medications on file  prior to visit.  No current facility-administered medications on file prior to visit.          Patient Active Problem List   Diagnosis     HYPERLIPIDEMIA LDL GOAL <160     Family history of pancreatic cancer     Family history of breast cancer     Depression, major, recurrent, mild (H)     Past Surgical History:   Procedure Laterality Date     ABDOMEN SURGERY  Nov 2013    Hysterectomy     BIOPSY  Several    Uterus     C LIGATE FALLOPIAN TUBE       COLONOSCOPY  2/17/2014    Procedure: COLONOSCOPY;  Colon cancer screening       HC HYSTEROSCOPY, SURGICAL; W/ ENDOMETRIAL ABLATION, ANY METHOD  11/09/07     HYSTEROSCOPY  07/28/06     LAPAROSCOPIC ASSISTED HYSTERECTOMY VAGINAL, BILATERAL SALPINGO-OOPHORECTOMY, COMBINED  11/27/2013    [facility]     LAPAROSCOPIC ASSISTED HYSTERECTOMY VAGINAL, BILATERAL SALPINGO-OOPHORECTOMY, COMBINED  11/27/13    [facility]     oblation         Social History     Tobacco Use     Smoking status: Never Smoker     Smokeless tobacco: Never Used     Tobacco comment: N/A   Substance Use Topics     Alcohol use: No     Family History   Problem Relation Age of Onset     Diabetes Maternal Grandfather         As long as I knew him.     Cancer Paternal Grandmother         Skin & Breast     Cerebrovascular Disease Paternal Grandmother      Breast Cancer Paternal Grandmother      Cancer Father         Skin and throat cancer     Rheumatoid Arthritis Father      Tremor Father      Cerebrovascular Disease Father         12/11/2016 mini strokes     Skin Cancer Father      Alcoholism Father      Hyperlipidemia Father         12/12/2016 started Lipitor     Other Cancer Father         Skin and Throat Cancers     Hypertension Mother      Cancer Mother         pancreatic cancer     Diabetes Mother         Started after pancreatic cancer surgery     Pancreatic Cancer Mother      Hypertension Maternal Grandmother      Breast Cancer Maternal Grandmother      Cancer Paternal Grandfather          Lung     Musculoskeletal Disorder Brother         hip replacement             Problem list, Medication list, Allergies, and Medical/Social/Surgical histories reviewed in [facility] and updated as appropriate.    OBJECTIVE:                                                    /87   Pulse 86   Temp 98.5  F (36.9  C) (Temporal)   Wt 72.1 kg (159 lb)   LMP 10/10/2013   SpO2 99%   BMI 27.72 kg/m      GENERAL: healthy, alert and no distress  PHQ 9 score is 6, OLI 7 score is 3        ASSESSMENT/PLAN:                                                      59 year old female with the following diagnoses and treatment plan:      ICD-10-CM    1. Generalized body aches R52    2. Depression, major, recurrent, mild (H) F33.0 DULoxetine (CYMBALTA) 60 MG capsule   3. Lactose intolerance E73.9 Vitamin D Deficiency   4. Flu vaccine need Z23 FLU VAC, QUADRIVALENT (RIV4) RECOMBINANT DNA, IM       --Depression/generalized body aches: Increase duloxetine to 60 mg daily.  --Lactose intolerant: Check vitamin D level  --Follow-up in 6 weeks    Will call or return to clinic if worsening or symptoms not improving as discussed.  See Patient Instructions.      Braulio Cruz MD-PhD  [facility]    (Note: Chart documentation was done in part with Dragon Voice Recognition software. Although reviewed after completion, some word and grammatical errors may remain.)

## 2019-10-03 LAB — DEPRECATED CALCIDIOL+CALCIFEROL SERPL-MC: 40 UG/L (ref 20–75)

## 2019-10-03 ASSESSMENT — ANXIETY QUESTIONNAIRES: GAD7 TOTAL SCORE: 3

## 2019-10-03 NOTE — RESULT ENCOUNTER NOTE
Dear Char,   Your recent lab results showed the following:  -- vitamin D level is pretty good already. No supplement is needed.     Please call or Mychart to our office if you have further questions.     Braulio Cruz MD-PhD

## 2019-10-17 DIAGNOSIS — F33.0 DEPRESSION, MAJOR, RECURRENT, MILD (H): ICD-10-CM

## 2019-10-18 RX ORDER — DULOXETIN HYDROCHLORIDE 30 MG/1
CAPSULE, DELAYED RELEASE ORAL
Qty: 30 CAPSULE | Refills: 0 | OUTPATIENT
Start: 2019-10-18

## 2019-12-02 DIAGNOSIS — F33.0 DEPRESSION, MAJOR, RECURRENT, MILD (H): ICD-10-CM

## 2019-12-02 NOTE — LETTER
December 3, 2019      Char Newman  [address] [address]              Dear Char,      We recently received a refill request from your pharmacy requesting a refill of : Cymbalta.    A review of your chart indicates that your last office visit was on 10/2.  An appointment was required in 2 months with your provider. We have authorized a one time 30 day refill of your medication to allow time for you to schedule.     Please call the clinic at [phone], or log in to your [employer] account at [email or web address] to schedule your appointment within that time frame so there is no delay in next month's refill.    Thank you for taking an active role in your healthcare.    Sincerely,          Braulio Cruz MD    If you need assistance with your [employer]at log in, please call [phone].

## 2019-12-03 RX ORDER — DULOXETIN HYDROCHLORIDE 60 MG/1
CAPSULE, DELAYED RELEASE ORAL
Qty: 30 CAPSULE | Refills: 0 | Status: SHIPPED | OUTPATIENT
Start: 2019-12-03 | End: 2020-01-07

## 2019-12-31 ENCOUNTER — NURSE TRIAGE (OUTPATIENT)
Dept: NURSING | Facility: CLINIC | Age: 59
End: 2019-12-31

## 2019-12-31 NOTE — TELEPHONE ENCOUNTER
S: Char is calling about an appointment.  B: 12/29 got OOB was disorientated went into the BR passed out and hit right side of head on floor.  Her  has notice her have moments when she stops breathing at night then gasps for air and resumes breathing.  Char told writer he has even filmed her doing this.  A: Char would like to make and appointment at the [facility].  She will be new to the [facility].  Transferring from the [facility]..  R: Transferred patient to the scheduling department.  Rena Larkin RN, [facility]       Reason for Disposition    Health Information question, no triage required and triager able to answer question    Protocols used: INFORMATION ONLY CALL - NO TRIAGE-P-AH

## 2020-01-02 ENCOUNTER — MYC MEDICAL ADVICE (OUTPATIENT)
Dept: PEDIATRICS | Facility: CLINIC | Age: 60
End: 2020-01-02

## 2020-01-02 DIAGNOSIS — F33.0 DEPRESSION, MAJOR, RECURRENT, MILD (H): ICD-10-CM

## 2020-01-02 NOTE — TELEPHONE ENCOUNTER
Routing to Scheduling Pool to:  Call the patient and assist with scheduling then forward to PCP/Provider for refill review.    Due for:  Follow up. Is she planning on transferring care?  Patient has already been given 1 cristhian refill.  Letter sent.      Once appointment is scheduled, a second cristhian refill will be sent to the pharmacy.      Last office visit:  10/2/19     Next 5 appointments (look out 90 days)    Jan 06, 2020  1:00 PM CST  [facility] [address] [place] [id number] [phone]        Maria Eugenia Delong RN RN,   [facility]

## 2020-01-02 NOTE — TELEPHONE ENCOUNTER
Patient has rescheduled visit on Mychart to be with Dr Cruz.    Routing to PCP to review and refill.    Lima Iglesias  Primary Care   [facility] [place]

## 2020-01-07 ENCOUNTER — MYC REFILL (OUTPATIENT)
Dept: PEDIATRICS | Facility: CLINIC | Age: 60
End: 2020-01-07

## 2020-01-07 DIAGNOSIS — F33.0 DEPRESSION, MAJOR, RECURRENT, MILD (H): ICD-10-CM

## 2020-01-07 RX ORDER — DULOXETIN HYDROCHLORIDE 60 MG/1
CAPSULE, DELAYED RELEASE ORAL
Qty: 30 CAPSULE | Refills: 0 | Status: CANCELLED | OUTPATIENT
Start: 2020-01-07

## 2020-01-07 RX ORDER — DULOXETIN HYDROCHLORIDE 60 MG/1
CAPSULE, DELAYED RELEASE ORAL
Qty: 30 CAPSULE | Refills: 0 | Status: SHIPPED | OUTPATIENT
Start: 2020-01-07 | End: 2020-01-16

## 2020-01-07 NOTE — TELEPHONE ENCOUNTER
Per chart review, this was filled today.    Maria Eugenia Delong, RN, BSN, PHN  [facility]

## 2020-01-16 ENCOUNTER — OFFICE VISIT (OUTPATIENT)
Dept: PEDIATRICS | Facility: CLINIC | Age: 60
End: 2020-01-16
Payer: COMMERCIAL

## 2020-01-16 VITALS
DIASTOLIC BLOOD PRESSURE: 92 MMHG | HEIGHT: 64 IN | HEART RATE: 79 BPM | OXYGEN SATURATION: 99 % | SYSTOLIC BLOOD PRESSURE: 130 MMHG | BODY MASS INDEX: 26.98 KG/M2 | TEMPERATURE: 97.4 F | WEIGHT: 158 LBS

## 2020-01-16 DIAGNOSIS — F32.5 DEPRESSION, MAJOR, IN REMISSION (H): Primary | ICD-10-CM

## 2020-01-16 DIAGNOSIS — G47.10 EXCESSIVE SOMNOLENCE DISORDER: ICD-10-CM

## 2020-01-16 DIAGNOSIS — R55 VASOVAGAL SYNCOPE: ICD-10-CM

## 2020-01-16 PROCEDURE — 99214 OFFICE O/P EST MOD 30 MIN: CPT | Performed by: INTERNAL MEDICINE

## 2020-01-16 PROCEDURE — 96127 BRIEF EMOTIONAL/BEHAV ASSMT: CPT | Performed by: INTERNAL MEDICINE

## 2020-01-16 RX ORDER — DULOXETIN HYDROCHLORIDE 60 MG/1
CAPSULE, DELAYED RELEASE ORAL
Qty: 90 CAPSULE | Refills: 1 | Status: SHIPPED | OUTPATIENT
Start: 2020-01-16 | End: 2021-04-06

## 2020-01-16 ASSESSMENT — MIFFLIN-ST. JEOR: SCORE: 1278.74

## 2020-01-16 ASSESSMENT — PATIENT HEALTH QUESTIONNAIRE - PHQ9: SUM OF ALL RESPONSES TO PHQ QUESTIONS 1-9: 2

## 2020-01-16 NOTE — PROGRESS NOTES
Subjective     Char Newman is a 59 year old female who presents to clinic today for the following health issues:    HPI   Fainted 12-28-19. Got up during the night to go to bathroom and fainted getting up from the toilet. No more fainting since then. Had goose head on forehead and scrape on cheek.     Patient reported that the night of December 28, 2019, her  woke her up from a dead sleep.  He saw that she was gasping for air.  After patient woke up, then she felt like she wanted to go to the bathroom.  After she urinated, got up, and then fell to the ground.  She did not have much prodrome.  She woke found herself on the floor, she got up looking the mirror, found a bruise on her face, a goose bump on the forehead.  She assumes she fell, and then went back to sleep.  Been out the goose bump and the bruise on the face are mostly gone.  She has not had any fainting spells since then.    She denies any lightheadedness or dizziness.  She does not normally have palpitations.  She did not notice any palpitation the night she fell.    She was started on Cymbalta about 2 months ago.  Her depression is much improved.  She is sleeping much better in terms of falling asleep a lot easier.    Reported that she does not feel refreshed when she wake up in the morning.  She usually sleeps from 9 PM to 4 AM, then gets up to go to work.  She has always had noisy breathing.  Her  actually recorded her breathing, and plated to her and to her children.  Patient reported that the noise sounded like someone coming out of a coffin, sounded creepy, not so much snoring. This has been a long standing issue, prior to starting cymbalta.     Patient does not have a history of hypertension diabetes.  The only medication she is taking is Cymbalta 60 mg daily.    ROS:  Constitutional, HEENT, cardiovascular, pulmonary, gi and gu systems are negative, except as otherwise noted.       No current outpatient medications on file prior to  visit.  No current facility-administered medications on file prior to visit.          Patient Active Problem List   Diagnosis     HYPERLIPIDEMIA LDL GOAL <160     Family history of pancreatic cancer     Family history of breast cancer     Depression, major, recurrent, mild (H)     Past Surgical History:   Procedure Laterality Date     ABDOMEN SURGERY  Nov 2013    Hysterectomy     BIOPSY  Several    Uterus     C LIGATE FALLOPIAN TUBE       COLONOSCOPY  2/17/2014    Procedure: COLONOSCOPY;  Colon cancer screening       HC HYSTEROSCOPY, SURGICAL; W/ ENDOMETRIAL ABLATION, ANY METHOD  11/09/07     HYSTEROSCOPY  07/28/06     LAPAROSCOPIC ASSISTED HYSTERECTOMY VAGINAL, BILATERAL SALPINGO-OOPHORECTOMY, COMBINED  11/27/2013    [facility]     LAPAROSCOPIC ASSISTED HYSTERECTOMY VAGINAL, BILATERAL SALPINGO-OOPHORECTOMY, COMBINED  11/27/13    [facility]     oblation         Social History     Tobacco Use     Smoking status: Never Smoker     Smokeless tobacco: Never Used     Tobacco comment: N/A   Substance Use Topics     Alcohol use: No     Family History   Problem Relation Age of Onset     Diabetes Maternal Grandfather         As long as I knew him.     Cancer Paternal Grandmother         Skin & Breast     Cerebrovascular Disease Paternal Grandmother      Breast Cancer Paternal Grandmother      Cancer Father         Skin and throat cancer     Rheumatoid Arthritis Father      Tremor Father      Cerebrovascular Disease Father         12/11/2016 mini strokes     Skin Cancer Father      Alcoholism Father      Hyperlipidemia Father         12/12/2016 started Lipitor     Other Cancer Father         Skin and Throat Cancers     Hypertension Mother      Cancer Mother         pancreatic cancer     Diabetes Mother         Started after pancreatic cancer surgery     Pancreatic Cancer Mother      Hypertension Maternal Grandmother      Breast Cancer Maternal Grandmother      Cancer Paternal Grandfather         Lung      "Musculoskeletal Disorder Brother         hip replacement             Problem list, Medication list, Allergies, and Medical/Social/Surgical histories reviewed in [facility] and updated as appropriate.    OBJECTIVE:                                                    BP (!) 130/92   Pulse 79   Temp 97.4  F (36.3  C) (Temporal)   Ht 1.613 m (5' 3.5\")   Wt 71.7 kg (158 lb)   LMP 10/10/2013   SpO2 99%   BMI 27.55 kg/m      GENERAL: healthy, alert and no distress  HEENT: faint bruise on the right cheek bone.   Lung: clear, no wheezing/rhonchi/crackles  Heart: RRR, normal S1/2, no murmur/gallop/rup  Ext: no cyanosis/clubbing/edema    PhQ9=2    Diagnostic test results:  No results found for any visits on 01/16/20.      ASSESSMENT/PLAN:                                                      59 year old female with the following diagnoses and treatment plan:      ICD-10-CM    1. Depression, major, in remission (H) F32.5 DULoxetine (CYMBALTA) 60 MG capsule   2. Excessive somnolence disorder G47.10 SLEEP EVALUATION & MANAGEMENT REFERRAL - ADULT -[facility]  [phone] (Age 15 and up)   3. Vasovagal syncope R55        --Her episode of syncope is likely micturition related vasovagal syncope.  For the time being we will not do extensive work-up, will just monitor.  I do not think this is related to Cymbalta.  --More concerning was her symptom of gasping for air, excessive somnolence.  Will need to rule out sleep apnea.  Referral made to sleep center to evaluate this  --Borderline elevated blood pressure: Could be related to sleep apnea, or stress-induced her white coat syndrome.  She could also possibly be developing hypertension.  We will have her do a blood pressure check and 1 of the [facility] pharmacies in the next 2 to 4 weeks.  --Depression: Doing very well.  PHQ 9 score is 2.  Continue with Cymbalta at the current dose.  --Follow-up sooner if she had another episode of syncope.    Will call or " return to clinic if worsening or symptoms not improving as discussed.  See Patient Instructions.      Braulio Cruz MD-PhD  [facility]    (Note: Chart documentation was done in part with Dragon Voice Recognition software. Although reviewed after completion, some word and grammatical errors may remain.)

## 2020-01-23 ENCOUNTER — TELEPHONE (OUTPATIENT)
Dept: SLEEP MEDICINE | Facility: CLINIC | Age: 60
End: 2020-01-23

## 2020-01-23 ENCOUNTER — OFFICE VISIT (OUTPATIENT)
Dept: SLEEP MEDICINE | Facility: CLINIC | Age: 60
End: 2020-01-23
Payer: COMMERCIAL

## 2020-01-23 VITALS
WEIGHT: 160 LBS | DIASTOLIC BLOOD PRESSURE: 89 MMHG | HEIGHT: 64 IN | SYSTOLIC BLOOD PRESSURE: 145 MMHG | BODY MASS INDEX: 27.31 KG/M2 | OXYGEN SATURATION: 97 % | HEART RATE: 92 BPM

## 2020-01-23 DIAGNOSIS — R29.818 SUSPECTED SLEEP APNEA: ICD-10-CM

## 2020-01-23 DIAGNOSIS — F51.04 CHRONIC INSOMNIA: Primary | ICD-10-CM

## 2020-01-23 DIAGNOSIS — G25.81 RESTLESS LEGS SYNDROME (RLS): ICD-10-CM

## 2020-01-23 DIAGNOSIS — R06.81 WITNESSED EPISODE OF APNEA: ICD-10-CM

## 2020-01-23 LAB — FERRITIN SERPL-MCNC: 120 NG/ML (ref 8–252)

## 2020-01-23 PROCEDURE — 36415 COLL VENOUS BLD VENIPUNCTURE: CPT | Performed by: PSYCHOLOGIST

## 2020-01-23 PROCEDURE — 90791 PSYCH DIAGNOSTIC EVALUATION: CPT | Performed by: PSYCHOLOGIST

## 2020-01-23 PROCEDURE — 82728 ASSAY OF FERRITIN: CPT | Performed by: PSYCHOLOGIST

## 2020-01-23 RX ORDER — ZOLPIDEM TARTRATE 5 MG/1
TABLET ORAL
Qty: 1 TABLET | Refills: 0 | Status: CANCELLED | OUTPATIENT
Start: 2020-01-23

## 2020-01-23 ASSESSMENT — ANXIETY QUESTIONNAIRES
5. BEING SO RESTLESS THAT IT IS HARD TO SIT STILL: NOT AT ALL
7. FEELING AFRAID AS IF SOMETHING AWFUL MIGHT HAPPEN: NOT AT ALL
1. FEELING NERVOUS, ANXIOUS, OR ON EDGE: NOT AT ALL
IF YOU CHECKED OFF ANY PROBLEMS ON THIS QUESTIONNAIRE, HOW DIFFICULT HAVE THESE PROBLEMS MADE IT FOR YOU TO DO YOUR WORK, TAKE CARE OF THINGS AT HOME, OR GET ALONG WITH OTHER PEOPLE: NOT DIFFICULT AT ALL
3. WORRYING TOO MUCH ABOUT DIFFERENT THINGS: NOT AT ALL
GAD7 TOTAL SCORE: 2
2. NOT BEING ABLE TO STOP OR CONTROL WORRYING: NOT AT ALL
6. BECOMING EASILY ANNOYED OR IRRITABLE: SEVERAL DAYS

## 2020-01-23 ASSESSMENT — PATIENT HEALTH QUESTIONNAIRE - PHQ9
5. POOR APPETITE OR OVEREATING: SEVERAL DAYS
SUM OF ALL RESPONSES TO PHQ QUESTIONS 1-9: 10

## 2020-01-23 ASSESSMENT — MIFFLIN-ST. JEOR: SCORE: 1287.82

## 2020-01-23 NOTE — PROGRESS NOTES
SLEEP MEDICINE CONSULTATION  Sleep Psychology    Name: Char Newman MRN# [id number]   Age: 59 year old YOB: 1960     Date of Consultation: Jan 23, 2020  Consultation is requested by: Braulio Cruz MD PhD  [address] [place] [address]  Primary care provider: Braulio Cruz    Reason for Sleep Consultation     Char Newman is a 59 year old female seen today for a behavioral sleep medicine consultation because of report of witnessed apnea, excessive somnolence, suspected sleep apnea.      Assessment and Plan     Sleep Diagnoses/Recommendations:    1. Chronic Insomnia F51.04   Patient history of insomnia over the last several years appears to be multi factored, associated with a history of mild depression, chronic pain, restless legs symptoms particularly affecting sleep initiation, and suspected sleep apnea.  Patient was advised to continue continue to limit time in bed to no more than 8 hours and consider reducing sleep offset variability.  She has a very early wake time during the workweek but was advised to avoid sleeping in on weekends.    3. Restless legs syndrome (RLS)  Symptoms of restless and uncomfortable sensations in legs appear to be affecting ability to fall asleep.  Symptoms are just somewhat relieved with movement and warrant further assessment.  Follow-up with sleep medicine.  -- Ferritin; Future    3 . Suspected sleep apnea  4. Witnessed episode of apnea  Patient is at moderate risk for sleep apnea (stop bang- 4/8) with a report of witnessed apnea, choking gasping and loud snoring.  This is accompanied by excessive tiredness and fatigue.  Recommend in lab sleep study and follow-up for sleep medicine consultation with Dr. Robb Walker/     - Comprehensive Sleep Study; Future      See patient instructions for initial treatment recommendations and behavioral sleep plan.    Summary Counseling:      Char was provided information about the pathophysiology of insomnia and psychophysiological  factors contributing to the onset and maintenance of insomnia associated with suspected sleep apnea and report of likely clinically significant restless leg symptoms affecting sleep quality and sleep initiation..  Treatment option were discussed including component of cognitive-behavioral therapy for insomnia. The benefits and potential early side effects of treatment including increased daytime sleepiness were discussed. She was advised to seek medical advise and consultation around use of or changes to prescription sleep medication, Patient was counseled on the importance of avoiding driving if drowsy.        Follow-up:   as needed     History of Present Sleep Complaint     Char Newman is a 59 year old year old female who reports increasing concern over the last 1-2 years about increased tiredness, fatigue and daytime sleepiness.  She was prompted to seek consultation with her primary care physician after her  woke her stating that he noticed her choking, gasping and stopping breathing.  She indicates that she is allowed snorer and became concerned that she may have possible sleep apnea.    Patient medical history significant for hyperlipidemia, treated mild major depressive disorder currently under control with duloxetine.    She also reports concern related to intensely uncomfortable and restless legs that affect her ability to sleep.  She states that she will often lay in bed and have uncomfortable sensations that makes her toss and turn.  She will frequently get out of bed and move her legs to try to relieve the symptoms.  While she has temporary relief, they usually persist.    Char reports that she usually goes to bed during the workweek at 830-9 PM.  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  On weekends she goes to bed a bit later between 9 30-10 PM and may sleep in until 6-7 AM.  She estimates that it takes her approximately 30-45 minutes to fall  "asleep at night.  She does not overall consider herself having difficulty falling asleep however.  She usually wakes up about 3 times a night.  She reports that she falls asleep quickly and estimates that she is not awake more than 10 wakes during the middle of the night.    Patient reports that she has a quiet and comfortable bedroom.  She has a relaxing bedtime routine.  She does use iPad in bed to read.  She feels this distracts from her uncomfortable leg symptoms.    Patient does not report sleep specific worry or rumination.  She does not complain about active mind.  She does report intermittent acute/chronic pain that can affect sleep at times.    Patient does report 1 episode of arousal from sleep in which she got out of bed and states that she apparently fell to the floor and found herself eventually awake in the prone position.  She was not injured.  She sometimes experiences brief \"dreamlike\" states prior to sleep onset.  These are brief.  She does not report any significant sleep paralysis, hypnopompic hallucinations, sudden sleep attacks or cataplexy.  She does not report other irregular sleep movement or sleepwalking.    Denies childhood history of sleepwalking, sleepwalking.  No reported history of bruxism.      Previous Sleep Studies:    No previous sleep studies.    Substance Use:    Tobacco: None reported    Caffeine: 1-2 caffeinated beverages in the morning     Alcohol: Occasional social use, not for soporific effect  Recreational Drugs: None reported      Screening          [place] Sleepiness Scale  Total score - [place]: 2 .    TRINH Total Score: 14       PHQ-9 SCORE 1/23/2020   PHQ-9 Total Score -   PHQ-9 Total Score MyChart -   PHQ-9 Total Score 10       OLI-7 SCORE 8/22/2019 10/2/2019 1/23/2020   Total Score - - -   Total Score 2 3 2       Patient Activation Score     MARIO Score (Last Two) 4/4/2012   MARIO Raw Score 50   Activation Score 86.3   MARIO Level 4         Vitals     BP (!) 145/89   Pulse " "92   Ht 1.613 m (5' 3.5\")   Wt 72.6 kg (160 lb)   LMP 10/10/2013   SpO2 97%   BMI 27.90 kg/m       Medical History     Patient Active Problem List   Diagnosis     HYPERLIPIDEMIA LDL GOAL <160     Family history of pancreatic cancer     Family history of breast cancer     Depression, major, recurrent, mild (H)        Current Outpatient Medications   Medication Sig Dispense Refill     DULoxetine (CYMBALTA) 60 MG capsule TAKE 1 CAPSULE BY MOUTH EVERY DAY 90 capsule 1       Past Surgical History:   Procedure Laterality Date     ABDOMEN SURGERY  Nov 2013    Hysterectomy     BIOPSY  Several    Uterus     C LIGATE FALLOPIAN TUBE       COLONOSCOPY  2/17/2014    Procedure: COLONOSCOPY;  Colon cancer screening        HYSTEROSCOPY, SURGICAL; W/ ENDOMETRIAL ABLATION, ANY METHOD  11/09/07     HYSTEROSCOPY  07/28/06     LAPAROSCOPIC ASSISTED HYSTERECTOMY VAGINAL, BILATERAL SALPINGO-OOPHORECTOMY, COMBINED  11/27/2013    [facility]     LAPAROSCOPIC ASSISTED HYSTERECTOMY VAGINAL, BILATERAL SALPINGO-OOPHORECTOMY, COMBINED  11/27/13    [facility]     oblation            Allergies   Allergen Reactions     Penicillins Hives     Sulfa Drugs Hives         Psychiatric History     Prior Psychiatric Diagnoses: yes, medical record indicates mild major depressive episode   Psychiatric Hospitalizations: none   Use of Psychotropics yes, duloxetine      Chemical Use     Prior Chemical Dependency Treatment: none         Family History     Family History   Problem Relation Age of Onset     Diabetes Maternal Grandfather         As long as I knew him.     Cancer Paternal Grandmother         Skin & Breast     Cerebrovascular Disease Paternal Grandmother      Breast Cancer Paternal Grandmother      Cancer Father         Skin and throat cancer     Rheumatoid Arthritis Father      Tremor Father      Cerebrovascular Disease Father         12/11/2016 mini strokes     Skin Cancer Father      Alcoholism Father      Hyperlipidemia " Father         12/12/2016 started Lipitor     Other Cancer Father         Skin and Throat Cancers     Hypertension Mother      Cancer Mother         pancreatic cancer     Diabetes Mother         Started after pancreatic cancer surgery     Pancreatic Cancer Mother      Hypertension Maternal Grandmother      Breast Cancer Maternal Grandmother      Cancer Paternal Grandfather         Lung     Musculoskeletal Disorder Brother         hip replacement       Sleep Disorders: None reported    Social History     Social History     Socioeconomic History     Marital status:      Spouse name: Simon     Number of children: 3     Years of education: 15     Highest education level: None   Occupational History     Occupation:      Comment: [employer]   Social Needs     Financial resource strain: None     Food insecurity:     Worry: None     Inability: None     Transportation needs:     Medical: None     Non-medical: None   Tobacco Use     Smoking status: Never Smoker     Smokeless tobacco: Never Used     Tobacco comment: N/A   Substance and Sexual Activity     Alcohol use: No     Drug use: No     Sexual activity: Never     Partners: Male   Lifestyle     Physical activity:     Days per week: None     Minutes per session: None     Stress: None   Relationships     Social connections:     Talks on phone: None     Gets together: None     Attends Christianity service: None     Active member of club or organization: None     Attends meetings of clubs or organizations: None     Relationship status: None     Intimate partner violence:     Fear of current or ex partner: None     Emotionally abused: None     Physically abused: None     Forced sexual activity: None   Other Topics Concern     Parent/sibling w/ CABG, MI or angioplasty before 65F 55M? No     Comment: N/A      Service No     Blood Transfusions No     Caffeine Concern No     Occupational Exposure No     Hobby Hazards No     Sleep  Concern Yes     Comment: patient has a hard time staying asleep     Stress Concern No     Weight Concern Yes     Special Diet No     Back Care No     Exercise Yes     Comment: x5 days a week     Bike Helmet No     Seat Belt Yes     Self-Exams Yes   Social History Narrative    . lives in [place]. jim travis spouse            Mental Status Examination     Char presented as appropriately dressed and groomed and was oriented X3 with speech language intact.  The patient was cooperative throughout the evaluation with no signs of hallucinations, delusions, loosening of associations or other thought disturbance.  Mood was normal.  Affect was congruent with mood. Insight and judgement we intact.  Memory was intact for recent and remote elements.  There was no report of suicidal ideation, intention or plan. Attention and concentration were within normal.      Time Spent: 45 minutes    Copy:   Braulio Cruz MD PhD  [address] [place] [address]    Gary Addison PsyD, LP, DBSM  Diplomate, Behavioral Sleep Medicine  [facility]  [place] and [place]

## 2020-01-23 NOTE — NURSING NOTE
"Chief Complaint   Patient presents with     Sleep Problem     consult- possible Insomnia, pt was gasping for air in her sleep and when she woke up she passed out and was referred by primary doctor to come in       Initial BP (!) 145/89   Pulse 92   Ht 1.613 m (5' 3.5\")   Wt 72.6 kg (160 lb)   LMP 10/10/2013   SpO2 97%   BMI 27.90 kg/m   Estimated body mass index is 27.9 kg/m  as calculated from the following:    Height as of this encounter: 1.613 m (5' 3.5\").    Weight as of this encounter: 72.6 kg (160 lb).    Medication Reconciliation: complete    Neck circumference: 14.5 inches / 37 centimeters.    "

## 2020-01-24 ASSESSMENT — ANXIETY QUESTIONNAIRES: GAD7 TOTAL SCORE: 2

## 2020-01-26 RX ORDER — ZOLPIDEM TARTRATE 5 MG/1
TABLET ORAL
Qty: 1 TABLET | Refills: 0 | Status: SHIPPED | OUTPATIENT
Start: 2020-01-26 | End: 2021-04-06

## 2020-02-21 ENCOUNTER — TELEPHONE (OUTPATIENT)
Dept: PEDIATRICS | Facility: CLINIC | Age: 60
End: 2020-02-21

## 2020-02-21 DIAGNOSIS — M19.90 ARTHRITIS: Primary | ICD-10-CM

## 2020-02-21 NOTE — TELEPHONE ENCOUNTER
Message routed to provider to review and advise. TERRANCE Kim Dr.'s office has requested a referral from my family doctor stating I have been seen for potential arthritic concerns.  I have had x-rays and blood work regarding my concern.  I am taking my concerns to a specialist at this time.    If you could please send a referral to Dr. Mcallister's office I would appreciate it.

## 2020-02-24 NOTE — TELEPHONE ENCOUNTER
Please find out what specialty is Dr. Mcallister. I'm not sure what referral is needed and for what reason.

## 2020-02-24 NOTE — TELEPHONE ENCOUNTER
Dr Mcallister's office is rheumatology being seen for arthritic concerns fax referral to [phone]. Brenda BHATIA CMA

## 2020-02-26 ENCOUNTER — MYC MEDICAL ADVICE (OUTPATIENT)
Dept: PEDIATRICS | Facility: CLINIC | Age: 60
End: 2020-02-26

## 2020-02-26 ENCOUNTER — TRANSFERRED RECORDS (OUTPATIENT)
Dept: HEALTH INFORMATION MANAGEMENT | Facility: CLINIC | Age: 60
End: 2020-02-26

## 2020-02-26 DIAGNOSIS — F33.0 DEPRESSION, MAJOR, RECURRENT, MILD (H): Primary | ICD-10-CM

## 2020-02-27 RX ORDER — DULOXETIN HYDROCHLORIDE 30 MG/1
CAPSULE, DELAYED RELEASE ORAL
Qty: 21 CAPSULE | Refills: 0 | Status: SHIPPED | OUTPATIENT
Start: 2020-02-27 | End: 2020-03-26

## 2020-03-17 ENCOUNTER — MYC MEDICAL ADVICE (OUTPATIENT)
Dept: PEDIATRICS | Facility: CLINIC | Age: 60
End: 2020-03-17

## 2020-03-26 ENCOUNTER — MYC REFILL (OUTPATIENT)
Dept: PEDIATRICS | Facility: CLINIC | Age: 60
End: 2020-03-26

## 2020-03-26 DIAGNOSIS — F33.0 DEPRESSION, MAJOR, RECURRENT, MILD (H): ICD-10-CM

## 2020-03-27 RX ORDER — DULOXETIN HYDROCHLORIDE 30 MG/1
30 CAPSULE, DELAYED RELEASE ORAL 2 TIMES DAILY
Qty: 180 CAPSULE | Refills: 1 | Status: SHIPPED | OUTPATIENT
Start: 2020-03-27 | End: 2021-03-04

## 2020-03-27 NOTE — TELEPHONE ENCOUNTER
Please see [employer] message that pt would prefer to stay on this medication.    Routing refill request to provider for review/approval because:  BP is not within goal for RN to fill.  BP Readings from Last 1 Encounters:   01/23/20 (!) 145/89     PHQ-9 score:    PHQ 1/23/2020   PHQ-9 Total Score 10   Q9: Thoughts of better off dead/self-harm past 2 weeks Not at all           Maren Busch RN, [facility]

## 2020-07-06 DIAGNOSIS — F32.5 DEPRESSION, MAJOR, IN REMISSION (H): ICD-10-CM

## 2020-07-07 ENCOUNTER — MYC REFILL (OUTPATIENT)
Dept: PEDIATRICS | Facility: CLINIC | Age: 60
End: 2020-07-07

## 2020-07-07 DIAGNOSIS — F33.0 DEPRESSION, MAJOR, RECURRENT, MILD (H): ICD-10-CM

## 2020-07-07 RX ORDER — DULOXETIN HYDROCHLORIDE 60 MG/1
CAPSULE, DELAYED RELEASE ORAL
Qty: 90 CAPSULE | Refills: 1 | OUTPATIENT
Start: 2020-07-07

## 2020-07-07 RX ORDER — DULOXETIN HYDROCHLORIDE 30 MG/1
30 CAPSULE, DELAYED RELEASE ORAL 2 TIMES DAILY
Qty: 180 CAPSULE | Refills: 1 | Status: CANCELLED | OUTPATIENT
Start: 2020-07-07

## 2020-07-14 DIAGNOSIS — F33.0 DEPRESSION, MAJOR, RECURRENT, MILD (H): ICD-10-CM

## 2020-07-15 ENCOUNTER — MYC REFILL (OUTPATIENT)
Dept: PEDIATRICS | Facility: CLINIC | Age: 60
End: 2020-07-15

## 2020-07-15 DIAGNOSIS — F33.0 DEPRESSION, MAJOR, RECURRENT, MILD (H): ICD-10-CM

## 2020-07-15 RX ORDER — DULOXETIN HYDROCHLORIDE 30 MG/1
30 CAPSULE, DELAYED RELEASE ORAL 2 TIMES DAILY
Qty: 180 CAPSULE | Refills: 1 | Status: CANCELLED | OUTPATIENT
Start: 2020-07-15

## 2020-07-15 NOTE — TELEPHONE ENCOUNTER
"Call to [facility] pharmacy [place], pharmacist reports never received prescription from 3/27/20, she is unsure what last prescription was filled from.  Gave verbal order for the last refill on prescription from 3/27/20, #180 no refills, this is to replace the \"lost\" prescription only, not extending prescription  "

## 2020-07-16 RX ORDER — DULOXETIN HYDROCHLORIDE 30 MG/1
CAPSULE, DELAYED RELEASE ORAL
Qty: 21 CAPSULE | Refills: 0 | OUTPATIENT
Start: 2020-07-16

## 2020-07-16 NOTE — TELEPHONE ENCOUNTER
Take1 capsule(30 mg) by mouth 2 times daily.3/27/20 #180/1 to [id number]. See notes- did not wean off, prescription sent as above should = medication supply through 9/27/20.

## 2020-09-28 ENCOUNTER — TRANSFERRED RECORDS (OUTPATIENT)
Dept: HEALTH INFORMATION MANAGEMENT | Facility: CLINIC | Age: 60
End: 2020-09-28

## 2021-01-09 ENCOUNTER — HEALTH MAINTENANCE LETTER (OUTPATIENT)
Age: 61
End: 2021-01-09

## 2021-03-03 DIAGNOSIS — F33.0 DEPRESSION, MAJOR, RECURRENT, MILD (H): ICD-10-CM

## 2021-03-03 NOTE — TELEPHONE ENCOUNTER
"Routing refill request to provider for review/approval because:  Requested Prescriptions   Pending Prescriptions Disp Refills    DULoxetine (CYMBALTA) 30 MG capsule 180 capsule 1     Sig: Take 1 capsule (30 mg) by mouth 2 times daily       Serotonin-Norepinephrine Reuptake Inhibitors  Failed - 3/3/2021 12:41 PM        Failed - Blood pressure under 140/90 in past 12 months     BP Readings from Last 3 Encounters:   01/23/20 (!) 145/89   01/16/20 (!) 130/92   10/02/19 137/87                 Failed - PHQ-9 score of less than 5 in past 6 months     Please review last PHQ-9 score.           Failed - Recent (6 mo) or future (30 days) visit within the authorizing provider's specialty     Patient had office visit in the last 6 months or has a visit in the next 30 days with authorizing provider or within the authorizing provider's specialty.  See \"Patient Info\" tab in inbasket, or \"Choose Columns\" in Meds & Orders section of the refill encounter.            Passed - Medication is active on med list        Passed - Patient is age 18 or older        Passed - No active pregnancy on record        Passed - No positive pregnancy test in past 12 months               Cristina Devlin BSN, RN, CPN          "

## 2021-03-04 RX ORDER — DULOXETIN HYDROCHLORIDE 30 MG/1
30 CAPSULE, DELAYED RELEASE ORAL 2 TIMES DAILY
Qty: 60 CAPSULE | Refills: 0 | Status: SHIPPED | OUTPATIENT
Start: 2021-03-04 | End: 2021-03-24

## 2021-04-05 ENCOUNTER — TELEPHONE (OUTPATIENT)
Dept: FAMILY MEDICINE | Facility: CLINIC | Age: 61
End: 2021-04-05

## 2021-04-05 ENCOUNTER — MYC REFILL (OUTPATIENT)
Dept: FAMILY MEDICINE | Facility: CLINIC | Age: 61
End: 2021-04-05

## 2021-04-05 DIAGNOSIS — F33.0 DEPRESSION, MAJOR, RECURRENT, MILD (H): ICD-10-CM

## 2021-04-05 DIAGNOSIS — F32.5 DEPRESSION, MAJOR, IN REMISSION (H): ICD-10-CM

## 2021-04-05 RX ORDER — DULOXETIN HYDROCHLORIDE 30 MG/1
30 CAPSULE, DELAYED RELEASE ORAL 2 TIMES DAILY
Qty: 180 CAPSULE | Refills: 0 | Status: CANCELLED | OUTPATIENT
Start: 2021-04-05

## 2021-04-05 NOTE — TELEPHONE ENCOUNTER
PA for DULoxetine (CYMBALTA) 30 MG capsule    Plan does not cover this medication 1 c bid    Phone# [phone]    ID# [id number]    PA or alternative    Soraya Jaquez

## 2021-04-06 RX ORDER — DULOXETIN HYDROCHLORIDE 60 MG/1
CAPSULE, DELAYED RELEASE ORAL
Qty: 90 CAPSULE | Refills: 1 | Status: SHIPPED | OUTPATIENT
Start: 2021-04-06 | End: 2021-04-22

## 2021-04-06 RX ORDER — DULOXETIN HYDROCHLORIDE 60 MG/1
60 CAPSULE, DELAYED RELEASE ORAL DAILY
Qty: 30 CAPSULE | Refills: 0 | Status: SHIPPED | OUTPATIENT
Start: 2021-04-06 | End: 2021-04-22

## 2021-04-06 NOTE — TELEPHONE ENCOUNTER
Pt returned call and I notified pt of dr notes below, she will call back to schedule an appt.     Jenny Vasquez, [facility]  Staff

## 2021-04-06 NOTE — TELEPHONE ENCOUNTER
This writer attempted to contact pt on 04/06/21      Reason for call medication and left message.      If patient calls back:   Inform pt of message below per Dr. Anthony England, CMA

## 2021-04-06 NOTE — TELEPHONE ENCOUNTER
Let pt know her insurance does not cover 30 mg bid but will cover 60 mg once a day. I sent her 60 mg once a day for 30 days.    She is overdue for physical. Please schedule within the next 30 days.

## 2021-04-15 ENCOUNTER — TELEPHONE (OUTPATIENT)
Dept: FAMILY MEDICINE | Facility: CLINIC | Age: 61
End: 2021-04-15

## 2021-04-15 NOTE — TELEPHONE ENCOUNTER
Patient Quality Outreach      Summary:    Patient has the following on her problem list/HM:     Depression / Dysthymia review    6 Month Remission: 4-8 month window range:   12 Month Remission: 10-14 month window range:        PHQ-9 SCORE 10/2/2019 1/16/2020 1/23/2020   PHQ-9 Total Score - - -   PHQ-9 Total Score MyChart - - -   PHQ-9 Total Score 10 2 10       If PHQ-9 recheck is 5 or more, route to provider for next steps.    Patient is due/failing the following:   PHQ-9 Needed    Type of outreach:    Sent MyChart message.    Questions for provider review:    None                                                                                                                                     Soraya Jaquez

## 2021-04-19 NOTE — PROGRESS NOTES
Char is a 60 year old who is being evaluated via a billable video visit.      How would you like to obtain your AVS? MyChart  If the video visit is dropped, the invitation should be resent by: Text to cell phone: [phone]  Will anyone else be joining your video visit? No      Video Start Time: 8:41 AM    Assessment & Plan       ICD-10-CM    1. Depression, major, in remission (H)  F32.5 DULoxetine (CYMBALTA) 60 MG capsule      Patient has had longstanding depression and arthritis that she has been using her Cymbalta for.  She is in the midst of changing jobs so does not have consistent insurance coverage at this time.  So has declined scheduling a full physical but is due whenever she can get her insurance coverage.  She is also due for her cholesterol and she gets a moment and have encouraged her to get her Covid vaccination.  As she has been doing well with both her mood and her arthritis, we did discuss possible reduction of her Cymbalta.  She is interested in this at this time so moved her to 30 mg capsules daily.  She will follow-up for this when she is establishing her new insurance.  I did give her an extended refills because we were unsure when she was going to be able to get back to us.      11 minutes spent on the date of the encounter doing chart review, history and exam, documentation and further activities per the note           Return in about 4 weeks (around 5/20/2021) for Physical Exam.    Bel Lowry MD, MD  [facility] MOJGAN Pardo is a 60 year old who presents for the following health issues     HPI     Medication Followup of Cymbalta     Taking Medication as prescribed: yes    Side Effects:  None    Medication Helping Symptoms:  yes      And arthritis.    Depression Followup    How are you doing with your depression since your last visit? No change    Are you having other symptoms that might be associated with depression? No    Have you had a significant life  event?  No     Are you feeling anxious or having panic attacks?   No    Do you have any concerns with your use of alcohol or other drugs? No    Social History     Tobacco Use     Smoking status: Never Smoker     Smokeless tobacco: Never Used     Tobacco comment: N/A   Substance Use Topics     Alcohol use: No     Drug use: No     PHQ 1/16/2020 1/23/2020 4/15/2021   PHQ-9 Total Score 2 10 0   Q9: Thoughts of better off dead/self-harm past 2 weeks Not at all Not at all Not at all     OLI-7 SCORE 8/22/2019 10/2/2019 1/23/2020   Total Score - - -   Total Score 2 3 2         Suicide Assessment Five-step Evaluation and Treatment (SAFE-T)      How many servings of fruits and vegetables do you eat daily?  4 or more    On average, how many sweetened beverages do you drink each day (Examples: soda, juice, sweet tea, etc.  Do NOT count diet or artificially sweetened beverages)?   1    How many days per week do you exercise enough to make your heart beat faster? 5    How many minutes a day do you exercise enough to make your heart beat faster? 30 - 60    How many days per week do you miss taking your medication? 0      Review of Systems   Constitutional, HEENT, cardiovascular, pulmonary, GI, , musculoskeletal, neuro, skin, endocrine and psych systems are negative, except as otherwise noted.      Objective    Vitals - Patient Reported  Pain Score: No Pain (0)      Vitals:  No vitals were obtained today due to virtual visit.    Physical Exam   GENERAL: Healthy, alert and no distress  EYES: Eyes grossly normal to inspection.  No discharge or erythema, or obvious scleral/conjunctival abnormalities.  RESP: No audible wheeze, cough, or visible cyanosis.  No visible retractions or increased work of breathing.    SKIN: Visible skin clear. No significant rash, abnormal pigmentation or lesions.  NEURO: Cranial nerves grossly intact.  Mentation and speech appropriate for age.  PSYCH: Mentation appears normal, affect normal/bright,  judgement and insight intact, normal speech and appearance well-groomed.                Video-Visit Details    Type of service:  Video Visit    Video End Time:8:47 AM    Originating Location (pt. Location): Home    Distant Location (provider location):  [facility]     Platform used for Video Visit: [email or web address]

## 2021-04-22 ENCOUNTER — VIRTUAL VISIT (OUTPATIENT)
Dept: FAMILY MEDICINE | Facility: OTHER | Age: 61
End: 2021-04-22
Payer: COMMERCIAL

## 2021-04-22 DIAGNOSIS — Z12.31 VISIT FOR SCREENING MAMMOGRAM: ICD-10-CM

## 2021-04-22 DIAGNOSIS — F32.5 DEPRESSION, MAJOR, IN REMISSION (H): Primary | ICD-10-CM

## 2021-04-22 PROCEDURE — 77063 BREAST TOMOSYNTHESIS BI: CPT | Mod: GC

## 2021-04-22 PROCEDURE — 99213 OFFICE O/P EST LOW 20 MIN: CPT | Mod: 95 | Performed by: FAMILY MEDICINE

## 2021-04-22 PROCEDURE — 77067 SCR MAMMO BI INCL CAD: CPT | Mod: GC

## 2021-04-22 RX ORDER — DULOXETIN HYDROCHLORIDE 30 MG/1
CAPSULE, DELAYED RELEASE ORAL
Qty: 90 CAPSULE | Refills: 1 | Status: SHIPPED | OUTPATIENT
Start: 2021-04-22 | End: 2021-09-16

## 2021-04-22 ASSESSMENT — PAIN SCALES - GENERAL: PAINLEVEL: NO PAIN (0)

## 2021-09-16 ENCOUNTER — E-VISIT (OUTPATIENT)
Dept: FAMILY MEDICINE | Facility: OTHER | Age: 61
End: 2021-09-16
Payer: COMMERCIAL

## 2021-09-16 DIAGNOSIS — F32.5 DEPRESSION, MAJOR, IN REMISSION (H): ICD-10-CM

## 2021-09-16 PROCEDURE — 99421 OL DIG E/M SVC 5-10 MIN: CPT | Performed by: FAMILY MEDICINE

## 2021-09-16 RX ORDER — DULOXETIN HYDROCHLORIDE 60 MG/1
CAPSULE, DELAYED RELEASE ORAL
Qty: 90 CAPSULE | Refills: 1 | Status: SHIPPED | OUTPATIENT
Start: 2021-09-16 | End: 2022-02-21

## 2021-09-16 ASSESSMENT — ANXIETY QUESTIONNAIRES
7. FEELING AFRAID AS IF SOMETHING AWFUL MIGHT HAPPEN: NOT AT ALL
6. BECOMING EASILY ANNOYED OR IRRITABLE: NOT AT ALL
GAD7 TOTAL SCORE: 0
8. IF YOU CHECKED OFF ANY PROBLEMS, HOW DIFFICULT HAVE THESE MADE IT FOR YOU TO DO YOUR WORK, TAKE CARE OF THINGS AT HOME, OR GET ALONG WITH OTHER PEOPLE?: NOT DIFFICULT AT ALL
3. WORRYING TOO MUCH ABOUT DIFFERENT THINGS: NOT AT ALL
2. NOT BEING ABLE TO STOP OR CONTROL WORRYING: NOT AT ALL
1. FEELING NERVOUS, ANXIOUS, OR ON EDGE: NOT AT ALL
GAD7 TOTAL SCORE: 0
4. TROUBLE RELAXING: NOT AT ALL
GAD7 TOTAL SCORE: 0
7. FEELING AFRAID AS IF SOMETHING AWFUL MIGHT HAPPEN: NOT AT ALL
5. BEING SO RESTLESS THAT IT IS HARD TO SIT STILL: NOT AT ALL

## 2021-09-16 ASSESSMENT — PATIENT HEALTH QUESTIONNAIRE - PHQ9
10. IF YOU CHECKED OFF ANY PROBLEMS, HOW DIFFICULT HAVE THESE PROBLEMS MADE IT FOR YOU TO DO YOUR WORK, TAKE CARE OF THINGS AT HOME, OR GET ALONG WITH OTHER PEOPLE: NOT DIFFICULT AT ALL
SUM OF ALL RESPONSES TO PHQ QUESTIONS 1-9: 2
SUM OF ALL RESPONSES TO PHQ QUESTIONS 1-9: 2

## 2021-09-17 ASSESSMENT — ANXIETY QUESTIONNAIRES: GAD7 TOTAL SCORE: 0

## 2021-09-17 ASSESSMENT — PATIENT HEALTH QUESTIONNAIRE - PHQ9: SUM OF ALL RESPONSES TO PHQ QUESTIONS 1-9: 2

## 2021-10-23 ENCOUNTER — HEALTH MAINTENANCE LETTER (OUTPATIENT)
Age: 61
End: 2021-10-23

## 2022-02-12 ENCOUNTER — HEALTH MAINTENANCE LETTER (OUTPATIENT)
Age: 62
End: 2022-02-12

## 2022-02-21 DIAGNOSIS — F32.5 DEPRESSION, MAJOR, IN REMISSION (H): ICD-10-CM

## 2022-02-21 RX ORDER — DULOXETIN HYDROCHLORIDE 60 MG/1
CAPSULE, DELAYED RELEASE ORAL
Qty: 30 CAPSULE | Refills: 0 | Status: SHIPPED | OUTPATIENT
Start: 2022-02-21 | End: 2022-03-21

## 2022-02-21 NOTE — TELEPHONE ENCOUNTER
"Requested Prescriptions   Pending Prescriptions Disp Refills     DULoxetine (CYMBALTA) 60 MG capsule 90 capsule 1     Sig: TAKE 1 CAPSULE BY MOUTH EVERY DAY       Serotonin-Norepinephrine Reuptake Inhibitors  Failed - 2/21/2022  8:17 AM        Failed - Blood pressure under 140/90 in past 12 months     BP Readings from Last 3 Encounters:   01/23/20 (!) 145/89   01/16/20 (!) 130/92   10/02/19 137/87                 Passed - PHQ-9 score of less than 5 in past 6 months     Please review last PHQ-9 score.           Passed - Medication is active on med list        Passed - Patient is age 18 or older        Passed - No active pregnancy on record        Passed - No positive pregnancy test in past 12 months        Passed - Recent (6 mo) or future (30 days) visit within the authorizing provider's specialty     Patient had office visit in the last 6 months or has a visit in the next 30 days with authorizing provider or within the authorizing provider's specialty.  See \"Patient Info\" tab in inbasket, or \"Choose Columns\" in Meds & Orders section of the refill encounter.                 "

## 2022-04-03 ASSESSMENT — ENCOUNTER SYMPTOMS
WEAKNESS: 0
HEMATOCHEZIA: 0
SORE THROAT: 0
NERVOUS/ANXIOUS: 0
ARTHRALGIAS: 1
FREQUENCY: 0
DYSURIA: 0
MYALGIAS: 1
BREAST MASS: 0
PARESTHESIAS: 0
HEARTBURN: 1
PALPITATIONS: 0
NAUSEA: 0
COUGH: 0
HEMATURIA: 0
ABDOMINAL PAIN: 0
DIZZINESS: 0
JOINT SWELLING: 1
HEADACHES: 0
SHORTNESS OF BREATH: 0
FEVER: 0
DIARRHEA: 0
CONSTIPATION: 0
CHILLS: 0
EYE PAIN: 0

## 2022-04-06 ENCOUNTER — OFFICE VISIT (OUTPATIENT)
Dept: FAMILY MEDICINE | Facility: OTHER | Age: 62
End: 2022-04-06
Payer: COMMERCIAL

## 2022-04-06 VITALS
RESPIRATION RATE: 17 BRPM | OXYGEN SATURATION: 97 % | HEIGHT: 64 IN | WEIGHT: 160.4 LBS | TEMPERATURE: 97.9 F | HEART RATE: 81 BPM | BODY MASS INDEX: 27.39 KG/M2 | SYSTOLIC BLOOD PRESSURE: 136 MMHG | DIASTOLIC BLOOD PRESSURE: 86 MMHG

## 2022-04-06 DIAGNOSIS — Z00.00 ROUTINE GENERAL MEDICAL EXAMINATION AT A HEALTH CARE FACILITY: Primary | ICD-10-CM

## 2022-04-06 DIAGNOSIS — F33.0 DEPRESSION, MAJOR, RECURRENT, MILD (H): ICD-10-CM

## 2022-04-06 DIAGNOSIS — E78.5 HYPERLIPIDEMIA LDL GOAL <160: ICD-10-CM

## 2022-04-06 DIAGNOSIS — Z12.31 VISIT FOR SCREENING MAMMOGRAM: ICD-10-CM

## 2022-04-06 DIAGNOSIS — F32.5 DEPRESSION, MAJOR, IN REMISSION (H): ICD-10-CM

## 2022-04-06 DIAGNOSIS — Z13.220 SCREENING FOR HYPERLIPIDEMIA: ICD-10-CM

## 2022-04-06 LAB
CHOLEST SERPL-MCNC: 179 MG/DL
FASTING STATUS PATIENT QL REPORTED: YES
FASTING STATUS PATIENT QL REPORTED: YES
GLUCOSE BLD-MCNC: 100 MG/DL (ref 70–99)
HDLC SERPL-MCNC: 67 MG/DL
LDLC SERPL CALC-MCNC: 92 MG/DL
NONHDLC SERPL-MCNC: 112 MG/DL
TRIGL SERPL-MCNC: 98 MG/DL

## 2022-04-06 PROCEDURE — 82947 ASSAY GLUCOSE BLOOD QUANT: CPT | Performed by: FAMILY MEDICINE

## 2022-04-06 PROCEDURE — 80061 LIPID PANEL: CPT | Performed by: FAMILY MEDICINE

## 2022-04-06 PROCEDURE — 36415 COLL VENOUS BLD VENIPUNCTURE: CPT | Performed by: FAMILY MEDICINE

## 2022-04-06 PROCEDURE — 99213 OFFICE O/P EST LOW 20 MIN: CPT | Mod: 25 | Performed by: FAMILY MEDICINE

## 2022-04-06 PROCEDURE — 99396 PREV VISIT EST AGE 40-64: CPT | Performed by: FAMILY MEDICINE

## 2022-04-06 RX ORDER — DULOXETIN HYDROCHLORIDE 60 MG/1
CAPSULE, DELAYED RELEASE ORAL
Qty: 90 CAPSULE | Refills: 1 | Status: SHIPPED | OUTPATIENT
Start: 2022-04-06 | End: 2022-09-26

## 2022-04-06 ASSESSMENT — ENCOUNTER SYMPTOMS
DYSURIA: 0
BREAST MASS: 0
JOINT SWELLING: 1
NAUSEA: 0
NERVOUS/ANXIOUS: 0
HEARTBURN: 1
COUGH: 0
DIARRHEA: 0
CONSTIPATION: 0
DIZZINESS: 0
HEADACHES: 0
PARESTHESIAS: 0
SORE THROAT: 0
EYE PAIN: 0
HEMATOCHEZIA: 0
SHORTNESS OF BREATH: 0
FEVER: 0
PALPITATIONS: 0
HEMATURIA: 0
CHILLS: 0
WEAKNESS: 0
ABDOMINAL PAIN: 0
ARTHRALGIAS: 1
FREQUENCY: 0
MYALGIAS: 1

## 2022-04-06 ASSESSMENT — ANXIETY QUESTIONNAIRES
2. NOT BEING ABLE TO STOP OR CONTROL WORRYING: NOT AT ALL
6. BECOMING EASILY ANNOYED OR IRRITABLE: NOT AT ALL
1. FEELING NERVOUS, ANXIOUS, OR ON EDGE: NOT AT ALL
GAD7 TOTAL SCORE: 0
3. WORRYING TOO MUCH ABOUT DIFFERENT THINGS: NOT AT ALL
GAD7 TOTAL SCORE: 0
GAD7 TOTAL SCORE: 0
7. FEELING AFRAID AS IF SOMETHING AWFUL MIGHT HAPPEN: NOT AT ALL
7. FEELING AFRAID AS IF SOMETHING AWFUL MIGHT HAPPEN: NOT AT ALL
5. BEING SO RESTLESS THAT IT IS HARD TO SIT STILL: NOT AT ALL
4. TROUBLE RELAXING: NOT AT ALL

## 2022-04-06 ASSESSMENT — PAIN SCALES - GENERAL: PAINLEVEL: MODERATE PAIN (4)

## 2022-04-06 ASSESSMENT — PATIENT HEALTH QUESTIONNAIRE - PHQ9
SUM OF ALL RESPONSES TO PHQ QUESTIONS 1-9: 4
SUM OF ALL RESPONSES TO PHQ QUESTIONS 1-9: 4
10. IF YOU CHECKED OFF ANY PROBLEMS, HOW DIFFICULT HAVE THESE PROBLEMS MADE IT FOR YOU TO DO YOUR WORK, TAKE CARE OF THINGS AT HOME, OR GET ALONG WITH OTHER PEOPLE: NOT DIFFICULT AT ALL

## 2022-04-06 NOTE — PROGRESS NOTES
SUBJECTIVE:   CC: Char Newman is an 61 year old woman who presents for preventive health visit.         Healthy Habits:     Getting at least 3 servings of Calcium per day:  Yes    Bi-annual eye exam:  Yes    Dental care twice a year:  Yes    Sleep apnea or symptoms of sleep apnea:  None    Diet:  Regular (no restrictions)    Frequency of exercise:  1 day/week    Duration of exercise:  Less than 15 minutes    Taking medications regularly:  Yes    Medication side effects:  None    PHQ-2 Total Score: 0    Additional concerns today:  Yes      Arthritis - seems to be getting worse and in more spots  eczema     Today's PHQ-2 Score:   PHQ-2 ( 1999 Pfizer) 4/3/2022   Q1: Little interest or pleasure in doing things 0   Q2: Feeling down, depressed or hopeless 0   PHQ-2 Score 0   PHQ-2 Total Score (12-17 Years)- Positive if 3 or more points; Administer PHQ-A if positive -   Q1: Little interest or pleasure in doing things Not at all   Q2: Feeling down, depressed or hopeless Not at all   PHQ-2 Score 0       Abuse: Current or Past (Physical, Sexual or Emotional) - No  Do you feel safe in your environment? Yes    Have you ever done Advance Care Planning? (For example, a Health Directive, POLST, or a discussion with a medical provider or your loved ones about your wishes): Yes, patient states has an Advance Care Planning document and will bring a copy to the clinic.    Social History     Tobacco Use     Smoking status: Never Smoker     Smokeless tobacco: Never Used     Tobacco comment: N/A   Substance Use Topics     Alcohol use: No         Alcohol Use 4/3/2022   Prescreen: >3 drinks/day or >7 drinks/week? No   Prescreen: >3 drinks/day or >7 drinks/week? -       Reviewed orders with patient.  Reviewed health maintenance and updated orders accordingly - Yes  Lab work is in process    Breast Cancer Screening:    FHS-7:   Breast CA Risk Assessment (FHS-7) 4/3/2022   Did any of your first-degree relatives have breast or ovarian  cancer? No   Did any of your relatives have bilateral breast cancer? No   Did any man in your family have breast cancer? No   Did any woman in your family have breast and ovarian cancer? Yes   Did any woman in your family have breast cancer before age 50 y? No   Do you have 2 or more relatives with breast and/or ovarian cancer? Yes   Do you have 2 or more relatives with breast and/or bowel cancer? Yes       Mammogram Screening: Recommended mammography every 1-2 years with patient discussion and risk factor consideration  Pertinent mammograms are reviewed under the imaging tab.    History of abnormal Pap smear: NO - age 30-65 PAP every 5 years with negative HPV co-testing recommended  PAP / HPV Latest Ref Rng & Units 1/15/2016 12/21/2012 12/16/2011   PAP (Historical) - NIL NIL NIL   HPV16 NEG Negative - -   HPV18 NEG Negative - -   HRHPV NEG Negative - -     Reviewed and updated as needed this visit by clinical staff   Tobacco  Allergies  Meds  Problems  Med Hx  Surg Hx  Fam Hx  Soc   Hx        Reviewed and updated as needed this visit by Provider   Tobacco  Allergies  Meds  Problems  Med Hx  Surg Hx  Fam Hx             Review of Systems   Constitutional: Negative for chills and fever.   HENT: Negative for congestion, ear pain, hearing loss and sore throat.    Eyes: Negative for pain and visual disturbance.   Respiratory: Negative for cough and shortness of breath.    Cardiovascular: Negative for chest pain, palpitations and peripheral edema.   Gastrointestinal: Positive for heartburn. Negative for abdominal pain, constipation, diarrhea, hematochezia and nausea.   Breasts:  Negative for tenderness, breast mass and discharge.   Genitourinary: Negative for dysuria, frequency, genital sores, hematuria, pelvic pain, urgency, vaginal bleeding and vaginal discharge.   Musculoskeletal: Positive for arthralgias, joint swelling and myalgias.   Skin: Negative for rash.   Neurological: Negative for dizziness,  "weakness, headaches and paresthesias.   Psychiatric/Behavioral: Negative for mood changes. The patient is not nervous/anxious.           OBJECTIVE:   /86   Pulse 81   Temp 97.9  F (36.6  C) (Temporal)   Resp 17   Ht 1.613 m (5' 3.5\")   Wt 72.8 kg (160 lb 6.4 oz)   LMP 10/10/2013   SpO2 97%   BMI 27.97 kg/m    Physical Exam  GENERAL: healthy, alert and no distress  EYES: Eyes grossly normal to inspection, PERRL and conjunctivae and sclerae normal  HENT: ear canals and TM's normal, nose and mouth without ulcers or lesions  NECK: no adenopathy, no asymmetry, masses, or scars and thyroid normal to palpation  RESP: lungs clear to auscultation - no rales, rhonchi or wheezes  BREAST: normal without masses, tenderness or nipple discharge and no palpable axillary masses or adenopathy  CV: regular rate and rhythm, normal S1 S2, no S3 or S4, no murmur, click or rub, no peripheral edema and peripheral pulses strong  ABDOMEN: soft, nontender, no hepatosplenomegaly, no masses and bowel sounds normal  MS: no gross musculoskeletal defects noted, no edema  SKIN: no suspicious lesions or rashes  NEURO: Normal strength and tone, mentation intact and speech normal  PSYCH: mentation appears normal, affect normal/bright        ASSESSMENT/PLAN:       ICD-10-CM    1. Routine general medical examination at a health care facility  Z00.00 Glucose     Glucose   2. Depression, major, recurrent, mild (H)  F33.0    3. Hyperlipidemia LDL goal <160  E78.5    4. Screening for hyperlipidemia  Z13.220 Lipid panel reflex to direct LDL Fasting     Lipid panel reflex to direct LDL Fasting   5. Visit for screening mammogram  Z12.31 MA SCREENING DIGITAL BILAT - Future  (s+30)   6. Depression, major, in remission (H)  F32.5      Patient with a history of high cholesterol but has not yet been medicated and we are due for recheck which was done today.  Mood has been well controlled and just needed renewal of her medications so Cymbalta was " "renewed today.  Lastly she is due for mammogram which they will call to schedule as they are unable to work her in today.  We did clarify that she had her cervix removed and so no longer requires Pap smears and this was turned off    Patient has been advised of split billing requirements and indicates understanding: Yes    COUNSELING:  Reviewed preventive health counseling, as reflected in patient instructions       Regular exercise       Healthy diet/nutrition    Estimated body mass index is 27.97 kg/m  as calculated from the following:    Height as of this encounter: 1.613 m (5' 3.5\").    Weight as of this encounter: 72.8 kg (160 lb 6.4 oz).    Weight management plan: Discussed healthy diet and exercise guidelines    She reports that she has never smoked. She has never used smokeless tobacco.      Counseling Resources:  ATP IV Guidelines  Pooled Cohorts Equation Calculator  Breast Cancer Risk Calculator  BRCA-Related Cancer Risk Assessment: FHS-7 Tool  FRAX Risk Assessment  ICSI Preventive Guidelines  Dietary Guidelines for Americans, 2010  USDA's MyPlate  ASA Prophylaxis  Lung CA Screening    Bel Lowry MD, MD  [facility]  Answers for HPI/ROS submitted by the patient on 4/6/2022  If you checked off any problems, how difficult have these problems made it for you to do your work, take care of things at home, or get along with other people?: Not difficult at all  PHQ9 TOTAL SCORE: 4  OLI 7 TOTAL SCORE: 0      "

## 2022-04-06 NOTE — PATIENT INSTRUCTIONS
Preventive Health Recommendations  Female Ages 50 - 64    Yearly exam: See your health care provider every year in order to  o Review health changes.   o Discuss preventive care.    o Review your medicines if your doctor has prescribed any.      Get a Pap test every three years (unless you have an abnormal result and your provider advises testing more often).    If you get Pap tests with HPV test, you only need to test every 5 years, unless you have an abnormal result.     You do not need a Pap test if your uterus was removed (hysterectomy) and you have not had cancer.    You should be tested each year for STDs (sexually transmitted diseases) if you're at risk.     Have a mammogram every 1 to 2 years.    Have a colonoscopy at age 50, or have a yearly FIT test (stool test). These exams screen for colon cancer.      Have a cholesterol test every 5 years, or more often if advised.    Have a diabetes test (fasting glucose) every three years. If you are at risk for diabetes, you should have this test more often.     If you are at risk for osteoporosis (brittle bone disease), think about having a bone density scan (DEXA).    Shots: Get a flu shot each year. Get a tetanus shot every 10 years.    Nutrition:     Eat at least 5 servings of fruits and vegetables each day.    Eat whole-grain bread, whole-wheat pasta and brown rice instead of white grains and rice.    Get adequate Calcium and Vitamin D.     Lifestyle    Exercise at least 150 minutes a week (30 minutes a day, 5 days a week). This will help you control your weight and prevent disease.    Limit alcohol to one drink per day.    No smoking.     Wear sunscreen to prevent skin cancer.     See your dentist every six months for an exam and cleaning.    See your eye doctor every 1 to 2 years.    Water can be your best friend or worst enemy.    If water is not your enemy, shower/bath daily.    NEVER soak in bubble bath, oils, etc.    Keep them to 15-30 minutes in  lukewarm (NOT HOT) water.  After shower, pat/blot dry and apply moisturizer within minutes    Products that are advised for eczema include:  Soaps -- Dove, Caress, or Basis (only need in underarms and groin unless dirt noted)  Lotions -- Cera Ve, Cetaphil, Vanicream, Vaseline  Petroleum jelly can be used for extra moisturizer when needed but is greasy.

## 2022-04-07 ASSESSMENT — PATIENT HEALTH QUESTIONNAIRE - PHQ9: SUM OF ALL RESPONSES TO PHQ QUESTIONS 1-9: 4

## 2022-04-07 ASSESSMENT — ANXIETY QUESTIONNAIRES: GAD7 TOTAL SCORE: 0

## 2022-07-30 ENCOUNTER — HEALTH MAINTENANCE LETTER (OUTPATIENT)
Age: 62
End: 2022-07-30

## 2022-09-26 ENCOUNTER — TRANSFERRED RECORDS (OUTPATIENT)
Dept: HEALTH INFORMATION MANAGEMENT | Facility: CLINIC | Age: 62
End: 2022-09-26

## 2022-09-26 DIAGNOSIS — F32.5 DEPRESSION, MAJOR, IN REMISSION (H): ICD-10-CM

## 2022-09-26 RX ORDER — DULOXETIN HYDROCHLORIDE 60 MG/1
CAPSULE, DELAYED RELEASE ORAL
Qty: 90 CAPSULE | Refills: 0 | Status: SHIPPED | OUTPATIENT
Start: 2022-09-26 | End: 2022-12-26

## 2022-09-28 ENCOUNTER — LAB (OUTPATIENT)
Dept: LAB | Facility: OTHER | Age: 62
End: 2022-09-28
Payer: COMMERCIAL

## 2022-09-28 DIAGNOSIS — M19.90 SENILE ARTHRITIS: Primary | ICD-10-CM

## 2022-09-28 LAB
ALT SERPL W P-5'-P-CCNC: 34 U/L (ref 0–50)
AST SERPL W P-5'-P-CCNC: 15 U/L (ref 0–45)
BASOPHILS # BLD AUTO: 0 10E3/UL (ref 0–0.2)
BASOPHILS NFR BLD AUTO: 0 %
CREAT SERPL-MCNC: 0.7 MG/DL (ref 0.52–1.04)
CRP SERPL-MCNC: <2.9 MG/L (ref 0–8)
EOSINOPHIL # BLD AUTO: 0.3 10E3/UL (ref 0–0.7)
EOSINOPHIL NFR BLD AUTO: 3 %
ERYTHROCYTE [DISTWIDTH] IN BLOOD BY AUTOMATED COUNT: 14.3 % (ref 10–15)
ERYTHROCYTE [SEDIMENTATION RATE] IN BLOOD BY WESTERGREN METHOD: 7 MM/HR (ref 0–30)
GFR SERPL CREATININE-BSD FRML MDRD: >90 ML/MIN/1.73M2
HCT VFR BLD AUTO: 39.4 % (ref 35–47)
HGB BLD-MCNC: 12.9 G/DL (ref 11.7–15.7)
LYMPHOCYTES # BLD AUTO: 2.1 10E3/UL (ref 0.8–5.3)
LYMPHOCYTES NFR BLD AUTO: 28 %
MCH RBC QN AUTO: 28.7 PG (ref 26.5–33)
MCHC RBC AUTO-ENTMCNC: 32.7 G/DL (ref 31.5–36.5)
MCV RBC AUTO: 88 FL (ref 78–100)
MONOCYTES # BLD AUTO: 0.6 10E3/UL (ref 0–1.3)
MONOCYTES NFR BLD AUTO: 7 %
NEUTROPHILS # BLD AUTO: 4.8 10E3/UL (ref 1.6–8.3)
NEUTROPHILS NFR BLD AUTO: 62 %
PLATELET # BLD AUTO: 292 10E3/UL (ref 150–450)
RBC # BLD AUTO: 4.5 10E6/UL (ref 3.8–5.2)
WBC # BLD AUTO: 7.8 10E3/UL (ref 4–11)

## 2022-09-28 PROCEDURE — 84450 TRANSFERASE (AST) (SGOT): CPT

## 2022-09-28 PROCEDURE — 36415 COLL VENOUS BLD VENIPUNCTURE: CPT

## 2022-09-28 PROCEDURE — 85652 RBC SED RATE AUTOMATED: CPT

## 2022-09-28 PROCEDURE — 84460 ALANINE AMINO (ALT) (SGPT): CPT

## 2022-09-28 PROCEDURE — 85025 COMPLETE CBC W/AUTO DIFF WBC: CPT

## 2022-09-28 PROCEDURE — 82565 ASSAY OF CREATININE: CPT

## 2022-09-28 PROCEDURE — 86038 ANTINUCLEAR ANTIBODIES: CPT

## 2022-09-28 PROCEDURE — 86431 RHEUMATOID FACTOR QUANT: CPT

## 2022-09-28 PROCEDURE — 86140 C-REACTIVE PROTEIN: CPT

## 2022-09-29 LAB
ANA SER QL IF: NEGATIVE
RHEUMATOID FACT SER NEPH-ACNC: 7 IU/ML

## 2023-03-26 DIAGNOSIS — F32.5 DEPRESSION, MAJOR, IN REMISSION (H): ICD-10-CM

## 2023-03-28 NOTE — TELEPHONE ENCOUNTER
"Pending Prescriptions:                       Disp   Refills    DULoxetine (CYMBALTA) 60 MG capsule [Pharm*90 cap*3        Sig: TAKE 1 CAPSULE DAILY        Routing refill request to provider for review/approval because:    Serotonin-Norepinephrine Reuptake Inhibitors  Failed    Rerun Protocol (3/26/2023 11:17 PM)    PHQ-9 score of less than 5 in past 6 months    Please review last PHQ-9 score.     Recent (6 mo) or future (30 days) visit within the authorizing provider's specialty    Patient had office visit in the last 6 months or has a visit in the next 30 days with authorizing provider or within the authorizing provider's specialty.  See \"Patient Info\" tab in inbasket, or \"Choose Columns\" in Meds & Orders section of the refill encounter.      Blood pressure under 140/90 in past 12 months        BP Readings from Last 3 Encounters:   04/06/22 136/86   01/23/20 (!) 145/89   01/16/20 (!) 130/92           Medication is active on med list      Patient is age 18 or older      No active pregnancy on record      No positive pregnancy test in past 12 months        "

## 2023-03-29 RX ORDER — DULOXETIN HYDROCHLORIDE 60 MG/1
CAPSULE, DELAYED RELEASE ORAL
Qty: 90 CAPSULE | Refills: 0 | Status: SHIPPED | OUTPATIENT
Start: 2023-03-29 | End: 2023-05-11

## 2023-03-29 NOTE — TELEPHONE ENCOUNTER
Due for appt. Annabel given, please schedule. (Phone, ov, or evisit as appropriate).  Bel Lowry MD

## 2023-05-10 ASSESSMENT — ANXIETY QUESTIONNAIRES
2. NOT BEING ABLE TO STOP OR CONTROL WORRYING: SEVERAL DAYS
7. FEELING AFRAID AS IF SOMETHING AWFUL MIGHT HAPPEN: NEARLY EVERY DAY
6. BECOMING EASILY ANNOYED OR IRRITABLE: SEVERAL DAYS
GAD7 TOTAL SCORE: 13
1. FEELING NERVOUS, ANXIOUS, OR ON EDGE: SEVERAL DAYS
3. WORRYING TOO MUCH ABOUT DIFFERENT THINGS: SEVERAL DAYS
7. FEELING AFRAID AS IF SOMETHING AWFUL MIGHT HAPPEN: NEARLY EVERY DAY
4. TROUBLE RELAXING: NEARLY EVERY DAY
GAD7 TOTAL SCORE: 13
8. IF YOU CHECKED OFF ANY PROBLEMS, HOW DIFFICULT HAVE THESE MADE IT FOR YOU TO DO YOUR WORK, TAKE CARE OF THINGS AT HOME, OR GET ALONG WITH OTHER PEOPLE?: SOMEWHAT DIFFICULT
5. BEING SO RESTLESS THAT IT IS HARD TO SIT STILL: NEARLY EVERY DAY
IF YOU CHECKED OFF ANY PROBLEMS ON THIS QUESTIONNAIRE, HOW DIFFICULT HAVE THESE PROBLEMS MADE IT FOR YOU TO DO YOUR WORK, TAKE CARE OF THINGS AT HOME, OR GET ALONG WITH OTHER PEOPLE: SOMEWHAT DIFFICULT

## 2023-05-10 ASSESSMENT — PATIENT HEALTH QUESTIONNAIRE - PHQ9
SUM OF ALL RESPONSES TO PHQ QUESTIONS 1-9: 18
10. IF YOU CHECKED OFF ANY PROBLEMS, HOW DIFFICULT HAVE THESE PROBLEMS MADE IT FOR YOU TO DO YOUR WORK, TAKE CARE OF THINGS AT HOME, OR GET ALONG WITH OTHER PEOPLE: SOMEWHAT DIFFICULT
SUM OF ALL RESPONSES TO PHQ QUESTIONS 1-9: 18

## 2023-05-10 NOTE — PROGRESS NOTES
Char is a 62 year old who is being evaluated via a billable telephone visit.      What phone number would you like to be contacted at? [phone]  How would you like to obtain your AVS? Lexis    Distant Location (provider location):  On-site    Assessment & Plan       ICD-10-CM    1. Depression, major, in remission (H)  F32.5 DULoxetine (CYMBALTA) 60 MG capsule     [facility]  Referral        Patient states that she moved to live with her Father in the past two months. She notes that the past 2 weeks has been much better as things are stabilizing and becoming settled for her.  Advised patient that we can make changes to help her mood- as she has had some thoughts of self harm in the past.   Told patient that she can actively look into things that bring her robert- such as music, art, workouts, etc.   Encouraged patient to consult a counselor on ways she can incorporate activities that bring her robert. Follow up in 30 days. Sooner if needed.   No change in medication at this time.     Review of external notes as documented elsewhere in note  15 minutes spent by me on the date of the encounter doing chart review, history and exam, documentation and further activities per the note       Depression Screening Follow Up        5/10/2023    11:27 AM   PHQ   PHQ-9 Total Score 18   Q9: Thoughts of better off dead/self-harm past 2 weeks Several days   F/U: Thoughts of suicide or self-harm Yes   F/U: Self harm-plan No   F/U: Self-harm action No   F/U: Safety concerns No         5/10/2023    11:27 AM   Last PHQ-9   1.  Little interest or pleasure in doing things 0   2.  Feeling down, depressed, or hopeless 2   3.  Trouble falling or staying asleep, or sleeping too much 3   4.  Feeling tired or having little energy 3   5.  Poor appetite or overeating 3   6.  Feeling bad about yourself 2   7.  Trouble concentrating 2   8.  Moving slowly or restless 2   Q9: Thoughts of better off dead/self-harm past 2 weeks 1    PHQ-9 Total Score 18   In the past two weeks have you had thoughts of suicide or self harm? Yes   Do you have concerns about your personal safety or the safety of others? No   In the past 2 weeks have you thought about a plan or had intention to harm yourself? No   In the past 2 weeks have you acted on these thoughts in any way? No              View : No data to display.                  Follow Up      Follow Up Actions Taken    Discussed the following ways the patient can remain in a safe environment:  be around others  CONSULTATION/REFERRAL to counseling    Scribe Disclosure:   I, Kristie Christian, am serving as a scribe; to document services personally performed by Bel Lowry MD, MD -based on data collection and the provider's statements to me.     Provider Disclosure:  I agree with above History, Review of Systems, Physical exam and Plan.  I have reviewed the content of the documentation and have edited it as needed. I have personally performed the services documented here and the documentation accurately represents those services and the decisions I have made.      Electronically signed by:    Bel Lowry MD, MD  [facility] MOJGAN Pardo is a 62 year old, presenting for the following health issues:  Recheck Medication         View : No data to display.              HPI       Medication Followup of Duloxetine    Taking Medication as prescribed: yes    Side Effects:  None    Medication Helping Symptoms:  yes    The patient states that she moved in with her Father to help care for him. She notes that she hopes things settle down for her after the move is stabilized.   She declines wanting to increase or decrease her Cymbalta. She states that she does self reflecting and walks to help her mood.   The patient notes that she has been averaging 4 hours of sleep daily. She states that now things are starting to settle, she thinks she may be able to have a better balance in her life and  get new sleep.   The patient states that the past 2 weeks, her mood has improved. She notes that her job affects her mood as they are short staffed.       Review of Systems   CONSTITUTIONAL: NEGATIVE for fever, chills, change in weight  ENT/MOUTH: NEGATIVE for ear, mouth and throat problems  RESP: NEGATIVE for significant cough or SOB  CV: NEGATIVE for chest pain, palpitations or peripheral edema  PSYCHIATRIC: NEGATIVE for changes in mood or affect      Objective    Vitals - Patient Reported  Weight (Patient Reported): 140 lb (63.5 kg)  Pain Score: Moderate Pain (5)  Pain Loc: Hand (hands feet neck - althritist)    Physical Exam   healthy, alert and no distress  PSYCH: Alert and oriented times 3; coherent speech, normal   rate and volume, able to articulate logical thoughts, able   to abstract reason, no tangential thoughts, no hallucinations   or delusions  Her affect is normal and pleasant  RESP: No cough, no audible wheezing, able to talk in full sentences  Remainder of exam unable to be completed due to telephone visits    Lab on 09/28/2022   Component Date Value Ref Range Status     Rheumatoid Factor 09/28/2022 7  <12 IU/mL Final     KATRINA interpretation 09/28/2022 Negative  Negative Final      Negative:              <1:40  Borderline Positive:   1:40 - 1:80  Positive:              >1:80     ALT 09/28/2022 34  0 - 50 U/L Final     AST 09/28/2022 15  0 - 45 U/L Final     Creatinine 09/28/2022 0.70  0.52 - 1.04 mg/dL Final     GFR Estimate 09/28/2022 >90  >60 mL/min/1.73m2 Final    Effective December 21, 2021 eGFRcr in adults is calculated using the 2021 CKD-EPI creatinine equation which includes age and gender (Maria Ines et al., NEJ, DOI: 10.1056/TQNYrt1790257)     Erythrocyte Sedimentation Rate 09/28/2022 7  0 - 30 mm/hr Final     CRP Inflammation 09/28/2022 <2.9  0.0 - 8.0 mg/L Final     WBC Count 09/28/2022 7.8  4.0 - 11.0 10e3/uL Final     RBC Count 09/28/2022 4.50  3.80 - 5.20 10e6/uL Final     Hemoglobin  09/28/2022 12.9  11.7 - 15.7 g/dL Final     Hematocrit 09/28/2022 39.4  35.0 - 47.0 % Final     MCV 09/28/2022 88  78 - 100 fL Final     MCH 09/28/2022 28.7  26.5 - 33.0 pg Final     MCHC 09/28/2022 32.7  31.5 - 36.5 g/dL Final     RDW 09/28/2022 14.3  10.0 - 15.0 % Final     Platelet Count 09/28/2022 292  150 - 450 10e3/uL Final     % Neutrophils 09/28/2022 62  % Final     % Lymphocytes 09/28/2022 28  % Final     % Monocytes 09/28/2022 7  % Final     % Eosinophils 09/28/2022 3  % Final     % Basophils 09/28/2022 0  % Final     Absolute Neutrophils 09/28/2022 4.8  1.6 - 8.3 10e3/uL Final     Absolute Lymphocytes 09/28/2022 2.1  0.8 - 5.3 10e3/uL Final     Absolute Monocytes 09/28/2022 0.6  0.0 - 1.3 10e3/uL Final     Absolute Eosinophils 09/28/2022 0.3  0.0 - 0.7 10e3/uL Final     Absolute Basophils 09/28/2022 0.0  0.0 - 0.2 10e3/uL Final     No results found for any visits on 05/11/23.          Phone call duration: 15 minutes

## 2023-05-11 ENCOUNTER — VIRTUAL VISIT (OUTPATIENT)
Dept: FAMILY MEDICINE | Facility: OTHER | Age: 63
End: 2023-05-11
Payer: COMMERCIAL

## 2023-05-11 DIAGNOSIS — F32.5 DEPRESSION, MAJOR, IN REMISSION (H): ICD-10-CM

## 2023-05-11 PROCEDURE — 96127 BRIEF EMOTIONAL/BEHAV ASSMT: CPT | Performed by: FAMILY MEDICINE

## 2023-05-11 PROCEDURE — 99213 OFFICE O/P EST LOW 20 MIN: CPT | Mod: TEL | Performed by: FAMILY MEDICINE

## 2023-05-11 RX ORDER — DULOXETIN HYDROCHLORIDE 60 MG/1
60 CAPSULE, DELAYED RELEASE ORAL DAILY
Qty: 90 CAPSULE | Refills: 0 | Status: SHIPPED | OUTPATIENT
Start: 2023-05-11 | End: 2023-06-19

## 2023-05-21 ENCOUNTER — HEALTH MAINTENANCE LETTER (OUTPATIENT)
Age: 63
End: 2023-05-21

## 2023-06-08 ENCOUNTER — ANCILLARY PROCEDURE (OUTPATIENT)
Dept: MAMMOGRAPHY | Facility: OTHER | Age: 63
End: 2023-06-08
Attending: FAMILY MEDICINE
Payer: COMMERCIAL

## 2023-06-08 DIAGNOSIS — Z12.31 VISIT FOR SCREENING MAMMOGRAM: ICD-10-CM

## 2023-06-08 PROCEDURE — 77063 BREAST TOMOSYNTHESIS BI: CPT | Mod: TC | Performed by: STUDENT IN AN ORGANIZED HEALTH CARE EDUCATION/TRAINING PROGRAM

## 2023-06-08 PROCEDURE — 77067 SCR MAMMO BI INCL CAD: CPT | Mod: TC | Performed by: STUDENT IN AN ORGANIZED HEALTH CARE EDUCATION/TRAINING PROGRAM

## 2023-06-12 ASSESSMENT — ANXIETY QUESTIONNAIRES
GAD7 TOTAL SCORE: 16
4. TROUBLE RELAXING: SEVERAL DAYS
7. FEELING AFRAID AS IF SOMETHING AWFUL MIGHT HAPPEN: NEARLY EVERY DAY
7. FEELING AFRAID AS IF SOMETHING AWFUL MIGHT HAPPEN: NEARLY EVERY DAY
IF YOU CHECKED OFF ANY PROBLEMS ON THIS QUESTIONNAIRE, HOW DIFFICULT HAVE THESE PROBLEMS MADE IT FOR YOU TO DO YOUR WORK, TAKE CARE OF THINGS AT HOME, OR GET ALONG WITH OTHER PEOPLE: NOT DIFFICULT AT ALL
6. BECOMING EASILY ANNOYED OR IRRITABLE: SEVERAL DAYS
1. FEELING NERVOUS, ANXIOUS, OR ON EDGE: MORE THAN HALF THE DAYS
GAD7 TOTAL SCORE: 16
5. BEING SO RESTLESS THAT IT IS HARD TO SIT STILL: NEARLY EVERY DAY
8. IF YOU CHECKED OFF ANY PROBLEMS, HOW DIFFICULT HAVE THESE MADE IT FOR YOU TO DO YOUR WORK, TAKE CARE OF THINGS AT HOME, OR GET ALONG WITH OTHER PEOPLE?: NOT DIFFICULT AT ALL
3. WORRYING TOO MUCH ABOUT DIFFERENT THINGS: NEARLY EVERY DAY
2. NOT BEING ABLE TO STOP OR CONTROL WORRYING: NEARLY EVERY DAY

## 2023-06-19 ENCOUNTER — OFFICE VISIT (OUTPATIENT)
Dept: FAMILY MEDICINE | Facility: OTHER | Age: 63
End: 2023-06-19
Payer: COMMERCIAL

## 2023-06-19 VITALS
RESPIRATION RATE: 16 BRPM | HEIGHT: 64 IN | BODY MASS INDEX: 26.12 KG/M2 | OXYGEN SATURATION: 98 % | HEART RATE: 89 BPM | WEIGHT: 153 LBS | DIASTOLIC BLOOD PRESSURE: 80 MMHG | TEMPERATURE: 98.4 F | SYSTOLIC BLOOD PRESSURE: 116 MMHG

## 2023-06-19 DIAGNOSIS — E78.5 HYPERLIPIDEMIA LDL GOAL <160: ICD-10-CM

## 2023-06-19 DIAGNOSIS — F32.5 DEPRESSION, MAJOR, IN REMISSION (H): ICD-10-CM

## 2023-06-19 DIAGNOSIS — F33.0 DEPRESSION, MAJOR, RECURRENT, MILD (H): Primary | ICD-10-CM

## 2023-06-19 PROCEDURE — 99213 OFFICE O/P EST LOW 20 MIN: CPT | Performed by: FAMILY MEDICINE

## 2023-06-19 RX ORDER — DULOXETIN HYDROCHLORIDE 60 MG/1
60 CAPSULE, DELAYED RELEASE ORAL DAILY
Qty: 90 CAPSULE | Refills: 1 | Status: SHIPPED | OUTPATIENT
Start: 2023-06-19 | End: 2024-01-10

## 2023-06-19 ASSESSMENT — PAIN SCALES - GENERAL: PAINLEVEL: MODERATE PAIN (4)

## 2023-06-19 ASSESSMENT — PATIENT HEALTH QUESTIONNAIRE - PHQ9: SUM OF ALL RESPONSES TO PHQ QUESTIONS 1-9: 8

## 2023-06-19 ASSESSMENT — ANXIETY QUESTIONNAIRES: GAD7 TOTAL SCORE: 16

## 2023-06-19 NOTE — PROGRESS NOTES
"  Assessment & Plan       ICD-10-CM    1. Depression, major, recurrent, mild (H)  F33.0       2. Depression, major, in remission (H)  F32.5 DULoxetine (CYMBALTA) 60 MG capsule      3. Hyperlipidemia LDL goal <160  E78.5         Mood is still poorly controlled and she has not been able to get into counseling yet.  We talked about offering an increase in mood medications and she is not interested at this time so we did instead talk about options she can do for lifestyle changes and referred her to consider the self-help book of mind of mood to see if this helps.      14 minutes spent by me on the date of the encounter doing chart review, history and exam, documentation and further activities per the note       BMI:   Estimated body mass index is 26.68 kg/m  as calculated from the following:    Height as of this encounter: 1.613 m (5' 3.5\").    Weight as of this encounter: 69.4 kg (153 lb).   Weight management plan: Discussed healthy diet and exercise guidelines        Bel Lowry MD, MD  [facility] MOJGAN Pardo is a 63 year old, presenting for the following health issues:   Follow Up        6/19/2023     4:55 PM   Additional Questions   Roomed by brian   Accompanied by alone         6/19/2023     4:55 PM   Patient Reported Additional Medications   Patient reports taking the following new medications nabumetone as needed     History of Present Illness       Mental Health Follow-up:  Patient presents to follow-up on Depression & Anxiety.Patient's depression since last visit has been:  Medium  The patient is not having other symptoms associated with depression.  Patient's anxiety since last visit has been:  Medium  The patient is not having other symptoms associated with anxiety.  Any significant life events: other  Patient is feeling anxious or having panic attacks.  Patient has no concerns about alcohol or drug use.    She eats 2-3 servings of fruits and vegetables daily.She consumes " "0 sweetened beverage(s) daily.She exercises with enough effort to increase her heart rate 30 to 60 minutes per day.  She exercises with enough effort to increase her heart rate 5 days per week.   She is taking medications regularly.  Today's OLI-7 Score: 16               Review of Systems   Constitutional, HEENT, cardiovascular, pulmonary, GI, , musculoskeletal, neuro, skin, endocrine and psych systems are negative, except as otherwise noted.      Objective    /80   Pulse 89   Temp 98.4  F (36.9  C) (Temporal)   Resp 16   Ht 1.613 m (5' 3.5\")   Wt 69.4 kg (153 lb)   LMP 10/10/2013   SpO2 98%   BMI 26.68 kg/m    Body mass index is 26.68 kg/m .  Physical Exam   GENERAL: healthy, alert and no distress  RESP: lungs clear to auscultation - no rales, rhonchi or wheezes  CV: regular rate and rhythm, normal S1 S2, no S3 or S4, no murmur, click or rub, no peripheral edema and peripheral pulses strong  ABDOMEN: soft, nontender, no hepatosplenomegaly, no masses and bowel sounds normal  MS: no gross musculoskeletal defects noted, no edema  SKIN: no suspicious lesions or rashes  NEURO: Normal strength and tone, mentation intact and speech normal  PSYCH: mentation appears normal, affect normal/bright                    "

## 2023-06-27 ENCOUNTER — VIRTUAL VISIT (OUTPATIENT)
Dept: PSYCHOLOGY | Facility: CLINIC | Age: 63
End: 2023-06-27
Attending: FAMILY MEDICINE
Payer: COMMERCIAL

## 2023-06-27 DIAGNOSIS — F43.23 ACUTE ADJUSTMENT DISORDER WITH MIXED ANXIETY AND DEPRESSED MOOD: ICD-10-CM

## 2023-06-27 PROCEDURE — 90791 PSYCH DIAGNOSTIC EVALUATION: CPT | Mod: VID | Performed by: MARRIAGE & FAMILY THERAPIST

## 2023-06-27 ASSESSMENT — COLUMBIA-SUICIDE SEVERITY RATING SCALE - C-SSRS
1. HAVE YOU WISHED YOU WERE DEAD OR WISHED YOU COULD GO TO SLEEP AND NOT WAKE UP?: NO
TOTAL  NUMBER OF INTERRUPTED ATTEMPTS LIFETIME: NO
2. HAVE YOU ACTUALLY HAD ANY THOUGHTS OF KILLING YOURSELF?: NO
6. HAVE YOU EVER DONE ANYTHING, STARTED TO DO ANYTHING, OR PREPARED TO DO ANYTHING TO END YOUR LIFE?: NO
ATTEMPT LIFETIME: NO
TOTAL  NUMBER OF ABORTED OR SELF INTERRUPTED ATTEMPTS LIFETIME: NO

## 2023-06-27 ASSESSMENT — PATIENT HEALTH QUESTIONNAIRE - PHQ9
10. IF YOU CHECKED OFF ANY PROBLEMS, HOW DIFFICULT HAVE THESE PROBLEMS MADE IT FOR YOU TO DO YOUR WORK, TAKE CARE OF THINGS AT HOME, OR GET ALONG WITH OTHER PEOPLE: SOMEWHAT DIFFICULT
SUM OF ALL RESPONSES TO PHQ QUESTIONS 1-9: 6
SUM OF ALL RESPONSES TO PHQ QUESTIONS 1-9: 6

## 2023-06-27 NOTE — Clinical Note
Hi Dr. Lowry, I met with Char today and she was very open about her current circumstances. She did endorse suicidal thoughts but they are infrequent and she reports no concern for her own safety. Her thoughts are passive at best.  She seems to be doing well on the cymbalta and I suggested she not make any med changes while her father is dying. She will schedule as needed and was very open to counseling despite reporting she is not a very emotional person. Thank you for referring her and she will schedule with me as needed.  KENZIE Chahal [facility]umou Villarreal

## 2023-06-28 NOTE — PROGRESS NOTES
"[facility] Counseling      PATIENT'S NAME: Char Newman  PREFERRED NAME: Char  PRONOUNS:       MRN: [id number]  : 1960  ADDRESS: [address]  [facility] [address]  [facility] NUM[facility]:  [id number]  DATE OF SERVICE: 23  START TIME: 1:00PM  END TIME: 1:55PM  PREFERRED PHONE: [phone]  May we leave a program related message: Yes  SERVICE MODALITY:  Video Visit:      Provider verified identity through the following two step process.  Patient provided:  Patient     Telemedicine Visit: The patient's condition can be safely assessed and treated via synchronous audio and visual telemedicine encounter.      Reason for Telemedicine Visit: Patient has requested telehealth visit    Originating Site (Patient Location): Patient's home    Distant Site (Provider Location): Provider Remote Setting- Home Office    Consent:  The patient/guardian has verbally consented to: the potential risks and benefits of telemedicine (video visit) versus in person care; bill my insurance or make self-payment for services provided; and responsibility for payment of non-covered services.     Patient would like the video invitation sent by:  My Chart    Mode of Communication:  Video Conference via Joota    Distant Location (Provider):  Off-site    As the provider I attest to compliance with applicable laws and regulations related to telemedicine.    UNIVERSAL ADULT Mental Health DIAGNOSTIC ASSESSMENT    Identifying Information:  Patient is a 63 year old,   individual.  Patient was referred for an assessment by primary care providerprimary care provider.  Patient attended the session alone.    Chief Complaint:   The reason for seeking services at this time is: \"Self doubt to the point of not caring or wanting to be involved with anyone or anything.\".  The problem(s) began 18.    Patient has attempted to resolve these concerns in the past through taking an antidepressant.    Social/Family " History:  Patient reported they grew up in [facility]  .  They were raised by biological parents  .  Parents were always together.  Patient reported that their childhood was strict and she was rebellious. Both her parents were hard on her.  Patient described their current relationships with family of origin as good with her father with whom she currently lives.     The patient describes their cultural background as .  Cultural influences and impact on patient's life structure, values, norms, and healthcare: I grew up in a Buddhism househould with strict parental guidance.  Otherwise growing up was good, nothing earth shattering. Contextual influences on patient's health include: Contextual Factors: Family Factors client is in blended family; current  has 2 daughters from previous marriage.    These factors will be addressed in the Preliminary Treatment plan. Patient identified their preferred language to be English. Patient reported they does not need the assistance of an  or other support involved in therapy.     Patient reported had no significant delays in developmental tasks.   Patient's highest education level was associate degree / vocational certificate  .  Patient identified the following learning problems: none reported.  Modifications will not be used to assist communication in therapy.  Patient reports they are  able to understand written materials.    Patient reported the following relationship history  and  to current spouse.  Patient's current relationship status is  for 15 years.   Patient identified their sexual orientation as heterosexual.  Patient reported having 5 child(malena). Patient identified partner as part of their support system.  Patient identified the quality of these relationships as stable and meaningful,  .      Patient's current living/housing situation involves staying in own home/apartment.  The immediate members of family and  household include Matthew, Garo,  and they report that housing is stable.    Patient is currently employed fulltime.  Patient reports their finances are obtained through employment. Patient does not identify finances as a current stressor.      Patient reported that they have not been involved with the legal system.    . Patient does not report being under probation/ parole/ jurisdiction. They are not under any current court jurisdiction. .    Patient's Strengths and Limitations:  Patient identified the following strengths or resources that will help them succeed in treatment: commitment to health and well being, exercise routine, friends / good social support, family support, insight, intelligence, motivation, strong social skills and work ethic. Things that may interfere with the patient's success in treatment include: none identified.     Assessments:  The following assessments were completed by patient for this visit:  PHQ2:       5/10/2023    11:27 AM 4/3/2022    11:40 AM 1/16/2020     5:21 PM 10/2/2019     4:12 PM 8/22/2019     4:47 PM 8/22/2019     3:49 PM 8/19/2019     7:44 AM   PHQ-2 ( 1999 [employer])   Q1: Little interest or pleasure in doing things  0 0 2 1 1 1   Q2: Feeling down, depressed or hopeless  0 0 2 1 1 1   PHQ-2 Score  0 0 4 2 2 2   PHQ-2 Total Score (12-17 Years)- Positive if 3 or more points; Administer PHQ-A if positive   0 4 2 2 2   Q1: Little interest or pleasure in doing things  Not at all     Several days   Q2: Feeling down, depressed or hopeless  Not at all     Several days   PHQ-2 Score Incomplete 0     2     PHQ9:       1/23/2020     1:08 PM 4/15/2021     8:09 AM 9/16/2021     9:03 AM 4/6/2022     8:15 AM 5/10/2023    11:27 AM 6/19/2023     4:57 PM 6/27/2023    12:30 PM   PHQ-9 SCORE   PHQ-9 Total Score MyChart  0 2 (Minimal depression) 4 (Minimal depression) 18 (Moderately severe depression)  6 (Mild depression)   PHQ-9 Total Score 10 0 2 4 18 8 6     GAD2:       6/27/2023     12:45 PM   OLI-2   Feeling nervous, anxious, or on edge 1   Not being able to stop or control worrying 1   OLI-2 Total Score 2     GAD7:       8/22/2019     4:47 PM 10/2/2019     4:12 PM 1/23/2020     1:08 PM 9/16/2021     9:03 AM 4/6/2022     8:16 AM 5/10/2023    11:22 AM 6/12/2023    11:14 AM   OLI-7 SCORE   Total Score    0 (minimal anxiety) 0 (minimal anxiety) 13 (moderate anxiety) 16 (severe anxiety)   Total Score 2 3 2 0 0 13 16     CAGE-AID:       6/27/2023    12:48 PM   CAGE-AID Total Score   Total Score 0   Total Score MyChart 0 (A total score of 2 or greater is considered clinically significant)     PROMIS 10-Global Health (all questions and answers displayed):       6/27/2023    12:47 PM   PROMIS 10   In general, would you say your health is: Very good   In general, would you say your quality of life is: Very good   In general, how would you rate your physical health? Very good   In general, how would you rate your mental health, including your mood and your ability to think? Good   In general, how would you rate your satisfaction with your social activities and relationships? Good   In general, please rate how well you carry out your usual social activities and roles Fair   To what extent are you able to carry out your everyday physical activities such as walking, climbing stairs, carrying groceries, or moving a chair? Completely   In the past 7 days, how often have you been bothered by emotional problems such as feeling anxious, depressed, or irritable? Sometimes   In the past 7 days, how would you rate your fatigue on average? Mild   In the past 7 days, how would you rate your pain on average, where 0 means no pain, and 10 means worst imaginable pain? 3   In general, would you say your health is: 4   In general, would you say your quality of life is: 4   In general, how would you rate your physical health? 4   In general, how would you rate your mental health, including your mood and your ability to  think? 3   In general, how would you rate your satisfaction with your social activities and relationships? 3   In general, please rate how well you carry out your usual social activities and roles. (This includes activities at home, at work and in your community, and responsibilities as a parent, child, spouse, employee, friend, etc.) 2   To what extent are you able to carry out your everyday physical activities such as walking, climbing stairs, carrying groceries, or moving a chair? 5   In the past 7 days, how often have you been bothered by emotional problems such as feeling anxious, depressed, or irritable? 3   In the past 7 days, how would you rate your fatigue on average? 2   In the past 7 days, how would you rate your pain on average, where 0 means no pain, and 10 means worst imaginable pain? 3   Global Mental Health Score 13   Global Physical Health Score 17   PROMIS TOTAL - SUBSCORES 30     PROMIS 10-Global Health (only subscores and total score):       6/27/2023    12:47 PM   PROMIS-10 Scores Only   Global Mental Health Score 13   Global Physical Health Score 17   PROMIS TOTAL - SUBSCORES 30     [place] Suicide Severity Rating Scale (Lifetime/Recent)      6/27/2023     8:00 PM   [place] Suicide Severity Rating (Lifetime/Recent)   1. Wish to be Dead (Lifetime) N   2. Non-Specific Active Suicidal Thoughts (Lifetime) N   Actual Attempt (Lifetime) N   Has subject engaged in non-suicidal self-injurious behavior? (Lifetime) N   Interrupted Attempts (Lifetime) N   Aborted or Self-Interrupted Attempt (Lifetime) N   Preparatory Acts or Behavior (Lifetime) N   Calculated C-SSRS Risk Score (Lifetime/Recent) No Risk Indicated       Personal and Family Medical History:  Patient does not report a family history of mental health concerns.  Patient reports family history includes Alcoholism in her father; Breast Cancer in her maternal grandmother and paternal grandmother; Cancer in her father, mother, paternal  grandfather, and paternal grandmother; Cerebrovascular Disease in her father and paternal grandmother; Diabetes in her maternal grandfather and mother; Hyperlipidemia in her father; Hypertension in her maternal grandmother and mother; Musculoskeletal Disorder in her brother; Osteoporosis in her father; Other Cancer in her father; Pancreatic Cancer in her mother; Rheumatoid Arthritis in her father; Skin Cancer in her father; Tremor in her father..     Patient does report Mental Health Diagnosis and/or Treatment.  Patient Patient reported the following previous diagnoses which include(s):   .  Patient reported current symptoms began when she and her  moved in with her aging father who has stage 4 cancer.   Patient has received mental health services in the past:     .  Psychiatric Hospitalizations:   when   ,  ,  ,  ,  ,  ,  ,  ,  ,  ,  .  Patient denies a history of civil commitment.  Currently, patient    receiving other mental health services.  These include primary care provider at .  For follow-up on ?.         Patient has had a physical exam to rule out medical causes for current symptoms.  Date of last physical exam was within the past year. Client was encouraged to follow up with PCP if symptoms were to develop. The patient has a [facility] Care Provider, who is named Bel Lowry..  Patient reports the following current medical concerns: rheumatoid arthritis.  Patient denies any issues with pain..   There are not significant appetite / nutritional concerns / weight changes.   Patient does not report a history of head injury / trauma / cognitive impairment.      Patient reports current meds as: 60 mg of cymbalta  No outpatient medications have been marked as taking for the 6/27/23 encounter (Virtual Visit) with Estela Oreilly LMFT.       Medication Adherence:  Patient reports taking.  taking prescribed medications as prescribed.    Patient Allergies:    Allergies   Allergen Reactions      Penicillins Hives     Strawberry Extract      Sulfa Antibiotics Hives       Medical History:    Past Medical History:   Diagnosis Date     Abnormal maternal glucose tolerance, complicating pregnancy, childbirth, or the puerperium, unspecified as to episode of care      Allergic rhinitis, cause unspecified     Allergic rhinitis     Arthritis 10 years    Father     Cancer (H) January 2012    Mother - Pancreatic Cancer     Cerebral infarction (H)     Father (12/11/16) and Fathers Mother     Congestive heart failure (H)     Mothers Father     Depressive disorder     Many Family Members     Depressive disorder, not elsewhere classified      EXCESSIVE MENSTRUATION 10/29/2007    Endometrial ablation done.     History of LAVH 11/27/13    for menorrhagia     NVD (normal vaginal delivery)     x3         Current Mental Status Exam:   Appearance:  Appropriate    Eye Contact:  Good   Psychomotor:  Normal       Gait / station:  no problem  Attitude / Demeanor: Cooperative  Interested Friendly Pleasant  Speech      Rate / Production: Normal/ Responsive Talkative      Volume:  Normal  volume      Language:  intact, no problems and good  Mood:   Anxious  Normal Sad  Grieving  Affect:   Appropriate  Worrisome    Thought Content: Clear   Thought Process: Goal Directed  Logical       Associations: No loosening of associations  Insight:   Good   Judgment:  Intact   Orientation:  All  Attention/concentration: Good      Substance Use:  Patient did not report a family history of substance use concerns; see medical history section for details.  Patient has not received chemical dependency treatment in the past.  Patient has not ever been to detox.      Patient is not currently receiving any chemical dependency treatment.           Substance History of use Age of first use Date of last use     Pattern and duration of use (include amounts and frequency)   Alcohol currently use   Teenager thru current.  I have an occassional beverage. 06/14/23  REPORTS SUBSTANCE USE: reports using substance 1 times per month and has 1 ? at a time.   Patient reports heaviest use was unknown.   Cannabis   never used     REPORTS SUBSTANCE USE: N/A     Amphetamines   never used     REPORTS SUBSTANCE USE: N/A   Cocaine/crack    never used       REPORTS SUBSTANCE USE: N/A   Hallucinogens never used         REPORTS SUBSTANCE USE: N/A   Inhalants never used         REPORTS SUBSTANCE USE: N/A   Heroin never used         REPORTS SUBSTANCE USE: N/A   Other Opiates never used     REPORTS SUBSTANCE USE: N/A   Benzodiazepine   never used     REPORTS SUBSTANCE USE: N/A   Barbiturates never used     REPORTS SUBSTANCE USE: N/A   Over the counter meds never used     REPORTS SUBSTANCE USE: N/A   Caffeine currently use Sodas, the original coke and ginger ale.   REPORTS SUBSTANCE USE: reports using substance 1 times per day and has 1 soda at a time.   Patient reports heaviest use is current use.   Nicotine  never used     REPORTS SUBSTANCE USE: N/A   Other substances not listed above:  Identify:  never used     REPORTS SUBSTANCE USE: N/A     Patient reported the following problems as a result of their substance use: no problems, not applicable.    Substance Use: No symptoms    Based on the negative CAGE score and clinical interview there  are not indications of drug or alcohol abuse.      Significant Losses / Trauma / Abuse / Neglect Issues:   Patient did not  serve in the .  There are indications or report of significant loss, trauma, abuse or neglect issues related to: death of her mother and divorce / relational changes she  20 years ago.  Concerns for possible neglect are not present.     Safety Assessment:   Patient denies current homicidal ideation and behaviors.  Patient denies current self-injurious ideation and behaviors.    Patient denied risk behaviors associated with substance use.  Patient denies any high risk behaviors associated with mental health  symptoms.  Patient reports the following current concerns for their personal safety: None.  Patient reports there are firearms in the house.     yes, they are secured. The firearms are secured in a locked space.    History of Safety Concerns:  Patient denied a history of homicidal ideation.     Patient denied a history of personal safety concerns.    Patient denied a history of assaultive behaviors.    Patient denied a history of sexual assault behaviors.     Patient denied a history of risk behaviors associated with substance use.  Patient denies any history of high risk behaviors associated with mental health symptoms.  Patient reports the following protective factors: forward or future oriented thinking; dedication to family or friends; safe and stable environment; regular sleep; effectively controls impulses; regular physical activity; abstinence from substances; adherence with prescribed medication; living with other people; daily obligations; structured day; healthy fear of risky behaviors or pain; financial stability    Risk Plan:  See Recommendations for Safety and Risk Management Plan    Review of Symptoms per patient report:   Depression: Excessive or inappropriate guilt, Feelings of hopelessness, Low self-worth, Ruminations, Irritability and Feeling sad, down, or depressed  Mery:  No Symptoms  Psychosis: No Symptoms  Anxiety: Excessive worry, Nervousness, Ruminations and Irritability  Panic:  No symptoms  Post Traumatic Stress Disorder:  No Symptoms   Eating Disorder: No Symptoms  ADD / ADHD:  No symptoms  Conduct Disorder: No symptoms  Autism Spectrum Disorder: No symptoms  Obsessive Compulsive Disorder: No Symptoms    Patient reports the following compulsive behaviors and treatment history: NA.      Diagnostic Criteria:   Adjustment Disorder  A. The development of emotional or behavioral symptoms in response to an identifiable stressor(s) occurring within 3 months of the onset of the stressor(s)  B.  These symptoms or behaviors are clinically significant, as evidenced by one or both of the following:  C. The stress-related disturbance does not meet criteria for another disorder & is not not an exacerbation of another mental disorder  D. The symptoms do not represent normal bereavement  E. Once the stressor or its consequences have terminated, the symptoms do not persist for more than an additional 6 months       * Adjustment Disorder with Mixed Anxiety and Depressed Mood: The predominant manifestation is a combination of depression and anxiety    Functional Status:  Patient reports the following functional impairments:  childcare / parenting, home life with father and living in his home, relationship(s) and work / vocational responsibilities.     Nonprogrammatic care:  Patient is requesting basic services to address current mental health concerns.    Clinical Summary:  1. Reason for assessment: depressed mood and anxiety related to adjustment of moving in with her sick elderly father  .  2. Psychosocial, Cultural and Contextual Factors: adult children who do not get along, challenging dynamics at work  .  3. Principal DSM5 Diagnoses  (Sustained by DSM5 Criteria Listed Above):   Adjustment Disorders  309.28 (F43.23) With mixed anxiety and depressed mood.  4. Other Diagnoses that is relevant to services:  None identified  5. Provisional Diagnosis:  Adjustment Disorders  309.28 (F43.23) With mixed anxiety and depressed mood as evidenced by self report of circumstances and symptoms .  6. Prognosis: Expect Improvement.  7. Likely consequences of symptoms if not treated: worsening.  8. Client strengths include:  caring, creative, employed, goal-focused, intelligent, motivated, responsible parent, support of family, friends and providers, wants to learn, willing to ask questions and work history .     Recommendations:     1. Plan for Safety and Risk Management:   Safety and Risk: Recommended that patient call 911 or go  to the local ED should there be a change in any of these risk factors..          Report to child / adult protection services was NA.     2. Patient's identified No cultural concerns identified.     3. Initial Treatment will focus on:    Adjustment Difficulties related to: family concerns. Client's doctor wanted her to see a therapist as she's still on Cymbalta after 10 years. Client is not someone who talks about her feelings much and is considered a direct straight talker. She is in a time of transition caring for her father. Also struggles because her 3 girls do not get along.      4. Resources/Service Plan:    services are not indicated.   Modifications to assist communication are not indicated.   Additional disability accommodations are not indicated.      5. Collaboration:   Collaboration / coordination of treatment will be initiated with the following  support professionals:  NA.      6.  Referrals:   The following referral(s) will be initiated: NA. Next Scheduled Appointment: TBD.      A Release of Information has been obtained for the following: NA.     Emergency Contact  was not obtained.      Clinical Substantiation/medical necessity for the above recommendations:  Client needs to keep stress low due to auto immune condition. Therapy and medication will help do this.    7. LINDA:    LINDA:  Discussed the general effects of drugs and alcohol on health and well-being. Provider gave patient printed information about the  effects of chemical use on their health and well being. Recommendations:  NA .     8. Records:   These were reviewed at time of assessment.   Information in this assessment was obtained from the medical record and  provided by patient who is a good historian.    Patient will have open access to their mental health medical record.    9.   Interactive Complexity: No      Provider Name/ Credentials:  KENZIE Chahal  June 27, 2023

## 2024-01-10 ENCOUNTER — MYC REFILL (OUTPATIENT)
Dept: FAMILY MEDICINE | Facility: OTHER | Age: 64
End: 2024-01-10
Payer: COMMERCIAL

## 2024-01-10 DIAGNOSIS — F32.5 DEPRESSION, MAJOR, IN REMISSION (H): ICD-10-CM

## 2024-01-11 RX ORDER — DULOXETIN HYDROCHLORIDE 60 MG/1
60 CAPSULE, DELAYED RELEASE ORAL DAILY
Qty: 60 CAPSULE | Refills: 0 | Status: SHIPPED | OUTPATIENT
Start: 2024-01-11 | End: 2024-03-18

## 2024-03-16 ENCOUNTER — MYC REFILL (OUTPATIENT)
Dept: FAMILY MEDICINE | Facility: OTHER | Age: 64
End: 2024-03-16
Payer: COMMERCIAL

## 2024-03-16 DIAGNOSIS — F32.5 DEPRESSION, MAJOR, IN REMISSION (H): ICD-10-CM

## 2024-03-18 ENCOUNTER — MYC MEDICAL ADVICE (OUTPATIENT)
Dept: FAMILY MEDICINE | Facility: OTHER | Age: 64
End: 2024-03-18
Payer: COMMERCIAL

## 2024-03-18 DIAGNOSIS — F32.5 DEPRESSION, MAJOR, IN REMISSION (H): ICD-10-CM

## 2024-03-18 RX ORDER — DULOXETIN HYDROCHLORIDE 60 MG/1
60 CAPSULE, DELAYED RELEASE ORAL DAILY
Qty: 60 CAPSULE | Refills: 0 | OUTPATIENT
Start: 2024-03-18

## 2024-03-18 RX ORDER — DULOXETIN HYDROCHLORIDE 60 MG/1
60 CAPSULE, DELAYED RELEASE ORAL DAILY
Qty: 5 CAPSULE | Refills: 0 | Status: SHIPPED | OUTPATIENT
Start: 2024-03-18 | End: 2024-05-06

## 2024-03-18 RX ORDER — DULOXETIN HYDROCHLORIDE 60 MG/1
60 CAPSULE, DELAYED RELEASE ORAL DAILY
Qty: 60 CAPSULE | Refills: 0 | Status: SHIPPED | OUTPATIENT
Start: 2024-03-18 | End: 2024-03-18

## 2024-04-30 SDOH — HEALTH STABILITY: PHYSICAL HEALTH: ON AVERAGE, HOW MANY DAYS PER WEEK DO YOU ENGAGE IN MODERATE TO STRENUOUS EXERCISE (LIKE A BRISK WALK)?: 7 DAYS

## 2024-04-30 SDOH — HEALTH STABILITY: PHYSICAL HEALTH: ON AVERAGE, HOW MANY MINUTES DO YOU ENGAGE IN EXERCISE AT THIS LEVEL?: 60 MIN

## 2024-04-30 ASSESSMENT — SOCIAL DETERMINANTS OF HEALTH (SDOH): HOW OFTEN DO YOU GET TOGETHER WITH FRIENDS OR RELATIVES?: ONCE A WEEK

## 2024-05-06 ENCOUNTER — OFFICE VISIT (OUTPATIENT)
Dept: FAMILY MEDICINE | Facility: OTHER | Age: 64
End: 2024-05-06
Payer: COMMERCIAL

## 2024-05-06 VITALS
OXYGEN SATURATION: 98 % | RESPIRATION RATE: 16 BRPM | BODY MASS INDEX: 27.29 KG/M2 | TEMPERATURE: 97.3 F | WEIGHT: 154 LBS | SYSTOLIC BLOOD PRESSURE: 122 MMHG | DIASTOLIC BLOOD PRESSURE: 88 MMHG | HEIGHT: 63 IN | HEART RATE: 79 BPM

## 2024-05-06 DIAGNOSIS — Z00.00 ROUTINE GENERAL MEDICAL EXAMINATION AT A HEALTH CARE FACILITY: Primary | ICD-10-CM

## 2024-05-06 DIAGNOSIS — F32.5 DEPRESSION, MAJOR, IN REMISSION (H): ICD-10-CM

## 2024-05-06 DIAGNOSIS — M47.22 OSTEOARTHRITIS OF SPINE WITH RADICULOPATHY, CERVICAL REGION: ICD-10-CM

## 2024-05-06 DIAGNOSIS — Z80.0 FAMILY HISTORY OF COLON CANCER: ICD-10-CM

## 2024-05-06 DIAGNOSIS — E78.5 HYPERLIPIDEMIA LDL GOAL <160: ICD-10-CM

## 2024-05-06 DIAGNOSIS — Z12.11 SCREEN FOR COLON CANCER: ICD-10-CM

## 2024-05-06 PROBLEM — Z82.61 FAMILY HISTORY OF RHEUMATOID ARTHRITIS: Status: ACTIVE | Noted: 2019-08-11

## 2024-05-06 PROCEDURE — 90471 IMMUNIZATION ADMIN: CPT | Performed by: FAMILY MEDICINE

## 2024-05-06 PROCEDURE — 90678 RSV VACC PREF BIVALENT IM: CPT | Performed by: FAMILY MEDICINE

## 2024-05-06 PROCEDURE — 99213 OFFICE O/P EST LOW 20 MIN: CPT | Mod: 25 | Performed by: FAMILY MEDICINE

## 2024-05-06 PROCEDURE — 99396 PREV VISIT EST AGE 40-64: CPT | Mod: 25 | Performed by: FAMILY MEDICINE

## 2024-05-06 RX ORDER — DULOXETIN HYDROCHLORIDE 60 MG/1
60 CAPSULE, DELAYED RELEASE ORAL DAILY
Qty: 5 CAPSULE | Refills: 0 | Status: CANCELLED | OUTPATIENT
Start: 2024-05-06

## 2024-05-06 RX ORDER — DULOXETIN HYDROCHLORIDE 30 MG/1
30 CAPSULE, DELAYED RELEASE ORAL DAILY
Qty: 90 CAPSULE | Refills: 0 | Status: SHIPPED | OUTPATIENT
Start: 2024-05-06 | End: 2024-06-03

## 2024-05-06 ASSESSMENT — PATIENT HEALTH QUESTIONNAIRE - PHQ9
SUM OF ALL RESPONSES TO PHQ QUESTIONS 1-9: 1
SUM OF ALL RESPONSES TO PHQ QUESTIONS 1-9: 1
10. IF YOU CHECKED OFF ANY PROBLEMS, HOW DIFFICULT HAVE THESE PROBLEMS MADE IT FOR YOU TO DO YOUR WORK, TAKE CARE OF THINGS AT HOME, OR GET ALONG WITH OTHER PEOPLE: NOT DIFFICULT AT ALL

## 2024-05-06 ASSESSMENT — PAIN SCALES - GENERAL: PAINLEVEL: NO PAIN (0)

## 2024-05-06 NOTE — PROGRESS NOTES
"Preventive Care Visit  [facility]  Bel Lowry MD, MD, Family Medicine  May 6, 2024      Assessment & Plan         ICD-10-CM    1. Routine general medical examination at a health care facility  Z00.00       2. Depression, major, in remission (H24)  F32.5 DULoxetine (CYMBALTA) 30 MG capsule      3. Hyperlipidemia LDL goal <160  E78.5 Lipid panel reflex to direct LDL Non-fasting      4. Screen for colon cancer  Z12.11 Colonoscopy Screening  Referral      5. Osteoarthritis of spine with radiculopathy, cervical region  M47.22       6. Family history of colon cancer  Z80.0 Colonoscopy Screening  Referral          Patient wants to talk about her osteoarthritis of the spine and we discussed basic cares though she is not interested in any imaging at this time.  The main symptoms are in her neck.  We also discussed her mood and she feels like she is doing quite well and would like to decrease medications.  Cymbalta was decreased from 60-30 and expect 2 to 4 months of this dose and if stable can stop.  She has due for colonoscopy in cholesterol as well      I spent a total of 23 minutes on the day of the visit.   Time spent by me doing chart review, history and exam, documentation and further activities per the note    Bel Lowry MD     Patient has been advised of split billing requirements and indicates understanding: Yes          BMI  Estimated body mass index is 26.95 kg/m  as calculated from the following:    Height as of this encounter: 1.61 m (5' 3.39\").    Weight as of this encounter: 69.9 kg (154 lb).   Weight management plan: Discussed healthy diet and exercise guidelines    Counseling  Appropriate preventive services were discussed with this patient, including applicable screening as appropriate for fall prevention, nutrition, physical activity, Tobacco-use cessation, weight loss and cognition.  Checklist reviewing preventive services available has been given to the " patient.  Reviewed patient's diet, addressing concerns and/or questions.   The patient was instructed to see the dentist every 6 months.   She is at risk for psychosocial distress and has been provided with information to reduce risk.           Sarah Pardo is a 63 year old, presenting for the following:  Physical        5/6/2024     3:15 PM   Additional Questions   Roomed by brian   Accompanied by alone         5/6/2024     3:15 PM   Patient Reported Additional Medications   Patient reports taking the following new medications none        Health Care Directive  Patient does not have a Health Care Directive or Living Will: Discussed advance care planning with patient; information given to patient to review.    HPI        4/30/2024   General Health   How would you rate your overall physical health? Good   Feel stress (tense, anxious, or unable to sleep) Only a little   (!) STRESS CONCERN      4/30/2024   Nutrition   Three or more servings of calcium each day? Yes   Diet: Regular (no restrictions)   How many servings of fruit and vegetables per day? (!) 2-3   How many sweetened beverages each day? 0-1         4/30/2024   Exercise   Days per week of moderate/strenous exercise 7 days   Average minutes spent exercising at this level 60 min         4/30/2024   Social Factors   Frequency of gathering with friends or relatives Once a week   Worry food won't last until get money to buy more No   Food not last or not have enough money for food? No   Do you have housing?  Yes   Are you worried about losing your housing? No   Lack of transportation? No   Unable to get utilities (heat,electricity)? No         4/30/2024   Fall Risk   Fallen 2 or more times in the past year? No   Trouble with walking or balance? No          4/30/2024   Dental   Dentist two times every year? (!) NO         4/30/2024   TB Screening   Were you born outside of the [place]? No       Today's PHQ-9 Score:       5/6/2024     2:48 PM   PHQ-9 SCORE  "  PHQ-9 Total Score MyChart 1 (Minimal depression)   PHQ-9 Total Score 1         4/30/2024   Substance Use   Alcohol more than 3/day or more than 7/wk No   Do you use any other substances recreationally? No     Social History     Tobacco Use     Smoking status: Never     Smokeless tobacco: Never     Tobacco comments:     N/A   Vaping Use     Vaping status: Never Used   Substance Use Topics     Alcohol use: No     Drug use: No           6/8/2023   LAST FHS-7 RESULTS   1st degree relative breast or ovarian cancer Yes   Any relative bilateral breast cancer No   Any male have breast cancer No   Any ONE woman have BOTH breast AND ovarian cancer No   Any woman with breast cancer before 50yrs No   2 or more relatives with breast AND/OR ovarian cancer Yes   2 or more relatives with breast AND/OR bowel cancer No                4/30/2024   STI Screening   New sexual partner(s) since last STI/HIV test? No     History of abnormal Pap smear: NO - age 30-65 PAP every 5 years with negative HPV co-testing recommended        Latest Ref Rng & Units 1/15/2016     8:30 AM 1/15/2016    12:00 AM 12/21/2012    10:48 AM   PAP / HPV   PAP (Historical)   NIL  NIL    HPV 16 DNA NEG Negative      HPV 18 DNA NEG Negative      Other HR HPV NEG Negative        ASCVD Risk   The 10-year ASCVD risk score (Yaron DK, et al., 2019) is: 2.7%    Values used to calculate the score:      Age: 63 years      Sex: Female      Is Non- : No      Diabetic: No      Tobacco smoker: No      Systolic Blood Pressure: 122 mmHg      Is BP treated: No      HDL Cholesterol: 67 mg/dL      Total Cholesterol: 179 mg/dL           Reviewed and updated as needed this visit by Provider   Tobacco  Allergies  Meds  Problems  Med Hx  Surg Hx  Fam Hx                 Objective    Exam  /88   Pulse 79   Temp 97.3  F (36.3  C) (Temporal)   Resp 16   Ht 1.61 m (5' 3.39\")   Wt 69.9 kg (154 lb)   LMP 10/10/2013   SpO2 98%   BMI 26.95 " "kg/m     Estimated body mass index is 26.95 kg/m  as calculated from the following:    Height as of this encounter: 1.61 m (5' 3.39\").    Weight as of this encounter: 69.9 kg (154 lb).    Physical Exam  GENERAL: alert and no distress  RESP: lungs clear to auscultation - no rales, rhonchi or wheezes  CV: regular rate and rhythm, normal S1 S2, no S3 or S4, no murmur, click or rub, no peripheral edema  MS: no gross musculoskeletal defects noted, no edema  SKIN: no suspicious lesions or rashes  NEURO: Normal strength and tone, mentation intact and speech normal  PSYCH: mentation appears normal, affect normal/bright        Signed Electronically by: Bel Lowry MD, MD    Answers submitted by the patient for this visit:  Patient Health Questionnaire (Submitted on 5/6/2024)  If you checked off any problems, how difficult have these problems made it for you to do your work, take care of things at home, or get along with other people?: Not difficult at all  PHQ9 TOTAL SCORE: 1    "

## 2024-05-06 NOTE — PATIENT INSTRUCTIONS
"Preventive Care Advice   This is general advice we often give to help people stay healthy. Your care team may have specific advice just for you. Please talk to your care team about your own preventive care needs.  Lifestyle  Exercise at least 150 minutes each week (30 minutes a day, 5 days a week).  Do muscle strengthening activities 2 days a week. These help control your weight and prevent disease.  No smoking.  Wear sunscreen to prevent skin cancer.  Have your home tested for radon every 2 to 5 years. Radon is a colorless, odorless gas that can harm your lungs. To learn more, go to www.health.[facility].[place]. and search for \"Radon in Homes.\"  Keep guns unloaded and locked up in a safe place like a safe or gun vault, or, use a gun lock and hide the keys. Always lock away bullets separately. To learn more, visit LikeBright.[place].gov and search for \"safe gun storage.\"  Nutrition  Eat 5 or more servings of fruits and vegetables each day.  Try wheat bread, brown rice and whole grain pasta (instead of white bread, rice, and pasta).  Get enough calcium and vitamin D. Check the label on foods and aim for 100% of the RDA (recommended daily allowance).  Regular exams  Have a dental exam and cleaning every 6 months.  See your health care team every year to talk about:  Any changes in your health.  Any medicines your care team has prescribed.  Preventive care, family planning, and ways to prevent chronic diseases.  Shots (vaccines)   HPV shots (up to age 26), if you've never had them before.  Hepatitis B shots (up to age 59), if you've never had them before.  COVID-19 shot: Get this shot when it's due.  Flu shot: Get a flu shot every year.  Tetanus shot: Get a tetanus shot every 10 years.  Pneumococcal, hepatitis A, and RSV shots: Ask your care team if you need these based on your risk.  Shingles shot (for age 50 and up).  General health tests  Diabetes screening:  Starting at age 35, Get screened for diabetes at least every 3 years.  If " you are younger than age 35, ask your care team if you should be screened for diabetes.  Cholesterol test: At age 39, start having a cholesterol test every 5 years, or more often if advised.  Bone density scan (DEXA): At age 50, ask your care team if you should have this scan for osteoporosis (brittle bones).  Hepatitis C: Get tested at least once in your life.  Abdominal aortic aneurysm screening: Talk to your doctor about having this screening if you:  Have ever smoked; and  Are biologically male; and  Are between the ages of 65 and 75.  STIs (sexually transmitted infections)  Before age 24: Ask your care team if you should be screened for STIs.  After age 24: Get screened for STIs if you're at risk. You are at risk for STIs (including HIV) if:  You are sexually active with more than one person.  You don't use condoms every time.  You or a partner was diagnosed with a sexually transmitted infection.  If you are at risk for HIV, ask about PrEP medicine to prevent HIV.  Get tested for HIV at least once in your life, whether you are at risk for HIV or not.  Cancer screening tests  Cervical cancer screening: If you have a cervix, begin getting regular cervical cancer screening tests at age 21. Most people who have regular screenings with normal results can stop after age 65. Talk about this with your provider.  Breast cancer scan (mammogram): If you've ever had breasts, begin having regular mammograms starting at age 40. This is a scan to check for breast cancer.  Colon cancer screening: It is important to start screening for colon cancer at age 45.  Have a colonoscopy test every 10 years (or more often if you're at risk) Or, ask your provider about stool tests like a FIT test every year or Cologuard test every 3 years.  To learn more about your testing options, visit: www.FameBit/342663.pdf.  For help making a decision, visit: mitzi/id20930.  Prostate cancer screening test: If you have a prostate and are age 55  to 69, ask your provider if you would benefit from a yearly prostate cancer screening test.  Lung cancer screening: If you are a current or former smoker age 50 to 80, ask your care team if ongoing lung cancer screenings are right for you.  For informational purposes only. Not to replace the advice of your health care provider. Copyright   2023 [employer]. All rights reserved. Clinically reviewed by the [facility]. ThisClicks 355731 - REV 04/24.    Learning About Stress  What is stress?     Stress is your body's response to a hard situation. Your body can have a physical, emotional, or mental response. Stress is a fact of life for most people, and it affects everyone differently. What causes stress for you may not be stressful for someone else.  A lot of things can cause stress. You may feel stress when you go on a job interview, take a test, or run a race. This kind of short-term stress is normal and even useful. It can help you if you need to work hard or react quickly. For example, stress can help you finish an important job on time.  Long-term stress is caused by ongoing stressful situations or events. Examples of long-term stress include long-term health problems, ongoing problems at work, or conflicts in your family. Long-term stress can harm your health.  How does stress affect your health?  When you are stressed, your body responds as though you are in danger. It makes hormones that speed up your heart, make you breathe faster, and give you a burst of energy. This is called the fight-or-flight stress response. If the stress is over quickly, your body goes back to normal and no harm is done.  But if stress happens too often or lasts too long, it can have bad effects. Long-term stress can make you more likely to get sick, and it can make symptoms of some diseases worse. If you tense up when you are stressed, you may develop neck, shoulder, or low back pain. Stress is  linked to high blood pressure and heart disease.  Stress also harms your emotional health. It can make you bucio, tense, or depressed. Your relationships may suffer, and you may not do well at work or school.  What can you do to manage stress?  You can try these things to help manage stress:   Do something active. Exercise or activity can help reduce stress. Walking is a great way to get started. Even everyday activities such as housecleaning or yard work can help.  Try yoga or vandana chi. These techniques combine exercise and meditation. You may need some training at first to learn them.  Do something you enjoy. For example, listen to music or go to a movie. Practice your hobby or do volunteer work.  Meditate. This can help you relax, because you are not worrying about what happened before or what may happen in the future.  Do guided imagery. Imagine yourself in any setting that helps you feel calm. You can use online videos, books, or a teacher to guide you.  Do breathing exercises. For example:  From a standing position, bend forward from the waist with your knees slightly bent. Let your arms dangle close to the floor.  Breathe in slowly and deeply as you return to a standing position. Roll up slowly and lift your head last.  Hold your breath for just a few seconds in the standing position.  Breathe out slowly and bend forward from the waist.  Let your feelings out. Talk, laugh, cry, and express anger when you need to. Talking with supportive friends or family, a counselor, or a kori leader about your feelings is a healthy way to relieve stress. Avoid discussing your feelings with people who make you feel worse.  Write. It may help to write about things that are bothering you. This helps you find out how much stress you feel and what is causing it. When you know this, you can find better ways to cope.  What can you do to prevent stress?  You might try some of these things to help prevent stress:  Manage your time.  "This helps you find time to do the things you want and need to do.  Get enough sleep. Your body recovers from the stresses of the day while you are sleeping.  Get support. Your family, friends, and community can make a difference in how you experience stress.  Limit your news feed. Avoid or limit time on social media or news that may make you feel stressed.  Do something active. Exercise or activity can help reduce stress. Walking is a great way to get started.  Where can you learn more?  Go to https://www.DN2K.net/patiented  Enter N032 in the search box to learn more about \"Learning About Stress.\"  Current as of: October 24, 2023               Content Version: 14.0    1709-8156 "[employer].   Care instructions adapted under license by your healthcare professional. If you have questions about a medical condition or this instruction, always ask your healthcare professional. "[employer] disclaims any warranty or liability for your use of this information.      "

## 2024-05-24 ENCOUNTER — PATIENT OUTREACH (OUTPATIENT)
Dept: FAMILY MEDICINE | Facility: OTHER | Age: 64
End: 2024-05-24
Payer: COMMERCIAL

## 2024-05-24 NOTE — TELEPHONE ENCOUNTER
Patient Quality Outreach    Patient is due for the following:   Depression  -  PHQ-9 needed    Next Steps:   No follow up needed at this time.    Type of outreach:    Chart review performed, no outreach needed.      Questions for provider review:    None           Mari Nguyen MA

## 2024-06-05 ENCOUNTER — TELEPHONE (OUTPATIENT)
Dept: GASTROENTEROLOGY | Facility: CLINIC | Age: 64
End: 2024-06-05
Payer: COMMERCIAL

## 2024-06-05 ENCOUNTER — HOSPITAL ENCOUNTER (OUTPATIENT)
Facility: AMBULATORY SURGERY CENTER | Age: 64
End: 2024-06-05
Attending: STUDENT IN AN ORGANIZED HEALTH CARE EDUCATION/TRAINING PROGRAM | Admitting: STUDENT IN AN ORGANIZED HEALTH CARE EDUCATION/TRAINING PROGRAM
Payer: COMMERCIAL

## 2024-06-05 NOTE — TELEPHONE ENCOUNTER
"Endoscopy Scheduling Screen    Have you had a positive Covid test in the last 14 days?  No    What is your communication preference for Instructions and/or Bowel Prep?   MyChart    What insurance is in the chart?  Other:  [employer]    Ordering/Referring Provider:     BUSHRA MOLINA      (If ordering provider performs procedure, schedule with ordering provider unless otherwise instructed. )    BMI: Estimated body mass index is 26.95 kg/m  as calculated from the following:    Height as of 5/6/24: 1.61 m (5' 3.39\").    Weight as of 5/6/24: 69.9 kg (154 lb).     Sedation Ordered  moderate sedation.   If patient BMI > 50 do not schedule in [facility].    If patient BMI > 45 do not schedule at [facility].    Are you taking methadone or Suboxone?  No    Have you had difficulties, pain, or discomfort during past endoscopy procedures?  No    Are you taking any prescription medications for pain 3 or more times per week?   NO, No RN review required.    Do you have a history of malignant hyperthermia?  No    (Females) Are you currently pregnant?        Have you been diagnosed or told you have pulmonary hypertension?   No    Do you have an LVAD?  No    Have you been told you have moderate to severe sleep apnea?  No    Have you been told you have COPD, asthma, or any other lung disease?  No    Do you have any heart conditions?  No     Have you ever had or are you waiting for an organ transplant?  No. Continue scheduling, no site restrictions.    Have you had a stroke or transient ischemic attack (TIA aka \"mini stroke\" in the last 6 months?   No    Have you been diagnosed with or been told you have cirrhosis of the liver?   No    Are you currently on dialysis?   No    Do you need assistance transferring?   No    BMI: Estimated body mass index is 26.95 kg/m  as calculated from the following:    Height as of 5/6/24: 1.61 m (5' 3.39\").    Weight as of 5/6/24: 69.9 kg (154 lb).     Is patients BMI > 40 and scheduling location [facility]?  No    Do " you take an injectable medication for weight loss or diabetes (excluding insulin)?  No    Do you take the medication Naltrexone?  No    Do you take blood thinners?  No       Prep   Are you currently on dialysis or do you have chronic kidney disease?  No    Do you have a diagnosis of diabetes?  No    Do you have a diagnosis of cystic fibrosis (CF)?  No    On a regular basis do you go 3 -5 days between bowel movements?  No    BMI > 40?  No    Preferred Pharmacy:    [id number] [place] [address] [facility] [address]  [address] [facility] [address]  Phone: [phone] Fax: [phone]    Final Scheduling Details     Procedure scheduled  Colonoscopy    Surgeon:  RAYNA     Date of procedure:  7/23     Pre-OP / PAC:   No - Not required for this site.    Location  [facility] - [facility] - Per order.    Sedation   Moderate Sedation - Per order.      Patient Reminders:   You will receive a call from a Nurse to review instructions and health history.  This assessment must be completed prior to your procedure.  Failure to complete the Nurse assessment may result in the procedure being cancelled.      On the day of your procedure, please designate an adult(s) who can drive you home stay with you for the next 24 hours. The medicines used in the exam will make you sleepy. You will not be able to drive.      You cannot take public transportation, ride share services, or non-medical taxi service without a responsible caregiver.  Medical transport services are allowed with the requirement that a responsible caregiver will receive you at your destination.  We require that drivers and caregivers are confirmed prior to your procedure.

## 2024-06-21 ENCOUNTER — MYC REFILL (OUTPATIENT)
Dept: FAMILY MEDICINE | Facility: OTHER | Age: 64
End: 2024-06-21
Payer: COMMERCIAL

## 2024-06-21 DIAGNOSIS — F32.5 DEPRESSION, MAJOR, IN REMISSION (H): ICD-10-CM

## 2024-06-21 RX ORDER — DULOXETIN HYDROCHLORIDE 20 MG/1
40 CAPSULE, DELAYED RELEASE ORAL DAILY
Qty: 180 CAPSULE | Refills: 1 | OUTPATIENT
Start: 2024-06-21

## 2024-07-15 ENCOUNTER — TELEPHONE (OUTPATIENT)
Dept: GASTROENTEROLOGY | Facility: CLINIC | Age: 64
End: 2024-07-15

## 2024-07-15 NOTE — TELEPHONE ENCOUNTER
Pre visit planning completed.      Procedure details:    Patient scheduled for Colonoscopy  on 7/23/24.     Arrival time: 0900. Procedure time 0945    Facility location: [facility] [place]; [address] Floor, [place] [address]. Check in location: 2nd Floor at Surgery desk.    Sedation type: Conscious sedation     Pre op exam needed? N/A    Indication for procedure: screening       Chart review:     Electronic implanted devices? No    Recent diagnosis of diverticulitis within the last 6 weeks? No    Diabetic? No      Medication review:    Anticoagulants? No    NSAIDS? No    Other medication HOLDING recommendations:  N/A      Prep for procedure:     Bowel prep recommendation: Standard Miralax  Due to: standard bowel prep.    Prep instructions sent via Zymetis         Mraen Kelsey RN  Endoscopy Procedure Pre Assessment RN  [phone] option 4

## 2024-07-15 NOTE — TELEPHONE ENCOUNTER
Attempted to contact patient in order to complete pre assessment questions.     No answer. Left message to return call to [phone] option 4    Callback communication sent via PAK.    Chelsey Rojo RN

## 2024-07-17 ENCOUNTER — TELEPHONE (OUTPATIENT)
Dept: GASTROENTEROLOGY | Facility: CLINIC | Age: 64
End: 2024-07-17
Payer: COMMERCIAL

## 2024-07-17 NOTE — TELEPHONE ENCOUNTER
Char Rojo, RNYesterday (8:42 AM)     Chaim Barbosa,  My apologies for not being able to connect via phone.  My schedule has changed and I an unable to make the 7/23 colonoscopy appointment.  Could I reschedule for some time in October, perhaps Thursday, October 17?     Thank You,  Char

## 2024-07-17 NOTE — TELEPHONE ENCOUNTER
Caller: MyChart from patient    Reason for Reschedule/Cancellation   (please be detailed, any staff messages or encounters to note?): Schedule conflict      Prior to reschedule please review:  Ordering Provider: Bel Lowry  Sedation Determined: CS  Does patient have any ASC Exclusions, please identify?: No      Notes on Cancelled Procedure:  Procedure: Lower Endoscopy [Colonoscopy]   Date: 7/23/2024  Location: [facility]; [address], [place] [address]   Surgeon: Nadia      Rescheduled: Yes,   Procedure: Lower Endoscopy [Colonoscopy]    Date: 10/17/2024   Location: [facility]; [address], [place] [address]    Surgeon: Moses   Sedation Level Scheduled  CS ,  Reason for Sedation Level Order   Instructions updated and sent: Lexis     Does patient need PAC or Pre -Op Rescheduled? : No       Did you cancel or rescheduled an EUS procedure? No.

## 2024-07-17 NOTE — TELEPHONE ENCOUNTER
Contacted patient to complete PA.      The patient states she needs to reschedule appointment and has been in contact with  via [employer] arranging a new appointment time.    Upon review of chart, writer notes patient has been corresponding with  to secure a new appt.    No action needed from writer at this time.    Corinne Kliber, RN  Endoscopy Procedure Pre Assessment RN  [phone] option 4

## 2024-10-04 NOTE — TELEPHONE ENCOUNTER
Rescheduled Colonoscopy  Due to patient requested another day/time.    Pre visit planning completed.      Procedure details:    Patient scheduled for Colonoscopy on 10/17/24.     Arrival time: 0630. Procedure time 0700    Facility location: [facility] [place]; [address], [place] [address]. Check in location: 2nd Floor at Surgery desk.    Sedation type: Conscious sedation     Pre op exam needed? No.    Indication for procedure:   Screen for colon cancer [Z12.11]      Family history of colon cancer [Z80.0]            Chart review:     Electronic implanted devices? No    Recent diagnosis of diverticulitis within the last 6 weeks? No      Medication review:    Diabetic? No    Anticoagulants? No    Weight loss medication/injectable? No GLP-1 medication per patient's medication list.  RN will verify with pre-assessment call.    Other medication HOLDING recommendations:  N/A      Prep for procedure:     Bowel prep recommendation: Standard Miralax  Due to: standard bowel prep.    Prep instructions sent via X[employer]ion         Corinne Kliber, RN  Endoscopy Procedure Pre Assessment   [phone] option 2

## 2024-10-07 NOTE — TELEPHONE ENCOUNTER
Attempted to contact patient in order to complete pre assessment questions.     No answer. Left message to return call to [phone] option 2.    Callback communication sent via Mee[employer].    Corinne Kliber, RN

## 2024-10-12 ENCOUNTER — HEALTH MAINTENANCE LETTER (OUTPATIENT)
Age: 64
End: 2024-10-12

## 2024-10-15 ENCOUNTER — TELEPHONE (OUTPATIENT)
Dept: GASTROENTEROLOGY | Facility: CLINIC | Age: 64
End: 2024-10-15
Payer: COMMERCIAL

## 2024-10-15 NOTE — TELEPHONE ENCOUNTER
Caller: Char    Reason for Reschedule/Cancellation   (please be detailed, any staff messages or encounters to note?): Patient cannot get time off from school      Prior to reschedule please review:  Ordering Provider: Alen Borrego Determined: CS  Does patient have any ASC Exclusions, please identify?: N      Notes on Cancelled Procedure:  Procedure: Lower Endoscopy [Colonoscopy]   Date: 10/17/24  Location: [facility]; [address], [place] [address]   Surgeon: Moses      Rescheduled: No, Patient going to wait until next summer       Did you cancel or rescheduled an EUS procedure? No.

## 2025-02-11 NOTE — PATIENT INSTRUCTIONS
Your BMI is Body mass index is 27.9 kg/m .  Weight management is a personal decision.  If you are interested in exploring weight loss strategies, the following discussion covers the approaches that may be successful. Body mass index (BMI) is one way to tell whether you are at a healthy weight, overweight, or obese. It measures your weight in relation to your height.  A BMI of 18.5 to 24.9 is in the healthy range. A person with a BMI of 25 to 29.9 is considered overweight, and someone with a BMI of 30 or greater is considered obese. More than two-thirds of [place] adults are considered overweight or obese.  Being overweight or obese increases the risk for further weight gain. Excess weight may lead to heart disease and diabetes.  Creating and following plans for healthy eating and physical activity may help you improve your health.  Weight control is part of healthy lifestyle and includes exercise, emotional health, and healthy eating habits. Careful eating habits lifelong are the mainstay of weight control. Though there are significant health benefits from weight loss, long-term weight loss with diet alone may be very difficult to achieve- studies show long-term success with dietary management in less than 10% of people. Attaining a healthy weight may be especially difficult to achieve in those with severe obesity. In some cases, medications, devices and surgical management might be considered.  What can you do?  If you are overweight or obese and are interested in methods for weight loss, you should discuss this with your provider.     Consider reducing daily calorie intake by 500 calories.     Keep a food journal.     Avoiding skipping meals, consider cutting portions instead.    Diet combined with exercise helps maintain muscle while optimizing fat loss. Strength training is particularly important for building and maintaining muscle mass. Exercise helps reduce stress, increase energy, and improves fitness.  I called Janeen back and she reported that the patient needs a CBC and a CMP.  She did have her A1c done 3 February and that was 6.8.  They did leave message with the daughter for her to return and have the blood work done.  I will reinforce this when I see the patient.   Increasing exercise without diet control, however, may not burn enough calories to loose weight.       Start walking three days a week 10-20 minutes at a time    Work towards walking thirty minutes five days a week     Eventually, increase the speed of your walking for 1-2 minutes at time    In addition, we recommend that you review healthy lifestyles and methods for weight loss available through the National [employer] of [employer] patient information sites:  http://win.niddk.nih.gov/publications/index.htm    And look into health and wellness programs that may be available through your health insurance provider, employer, local community center, or [employer].    Weight management plan: Patient was referred to their PCP to discuss a diet and exercise plan.

## 2025-06-14 ENCOUNTER — HEALTH MAINTENANCE LETTER (OUTPATIENT)
Age: 65
End: 2025-06-14